# Patient Record
Sex: MALE | Race: WHITE | NOT HISPANIC OR LATINO
[De-identification: names, ages, dates, MRNs, and addresses within clinical notes are randomized per-mention and may not be internally consistent; named-entity substitution may affect disease eponyms.]

---

## 2018-01-19 PROBLEM — Z00.00 ENCOUNTER FOR PREVENTIVE HEALTH EXAMINATION: Status: ACTIVE | Noted: 2018-01-19

## 2019-08-12 ENCOUNTER — APPOINTMENT (OUTPATIENT)
Dept: FAMILY MEDICINE | Facility: CLINIC | Age: 69
End: 2019-08-12
Payer: COMMERCIAL

## 2019-08-12 VITALS
OXYGEN SATURATION: 98 % | HEART RATE: 117 BPM | SYSTOLIC BLOOD PRESSURE: 122 MMHG | RESPIRATION RATE: 14 BRPM | HEIGHT: 76 IN | BODY MASS INDEX: 24.72 KG/M2 | DIASTOLIC BLOOD PRESSURE: 76 MMHG | WEIGHT: 203 LBS

## 2019-08-12 DIAGNOSIS — Z87.891 PERSONAL HISTORY OF NICOTINE DEPENDENCE: ICD-10-CM

## 2019-08-12 DIAGNOSIS — M51.26 OTHER INTERVERTEBRAL DISC DISPLACEMENT, LUMBAR REGION: ICD-10-CM

## 2019-08-12 PROCEDURE — 99213 OFFICE O/P EST LOW 20 MIN: CPT

## 2019-08-12 RX ORDER — CYCLOBENZAPRINE HYDROCHLORIDE 5 MG/1
5 TABLET, FILM COATED ORAL
Qty: 60 | Refills: 0 | Status: ACTIVE | COMMUNITY
Start: 2018-11-27

## 2019-08-12 NOTE — HISTORY OF PRESENT ILLNESS
[___ Weeks ago] : [unfilled] weeks ago [FreeTextEntry8] : He had a fall in his garden.  Eventually he was found to have a fracture in his pelvis . He has an appt with pain management - Dr Medina  on wednesday.   He feels a burning in his throat and has nausea- limits his ability to take his pain meds. He feels the pain is causing  him to have nausea and he can't keep down the pain meds he has.

## 2019-08-12 NOTE — HEALTH RISK ASSESSMENT
[11-15] : 11-15 [Yes] : Yes [2 - 4 times a month (2 pts)] : 2-4 times a month (2 points) [1 or 2 (0 pts)] : 1 or 2 (0 points) [Never (0 pts)] : Never (0 points) [No] : In the past 12 months have you used drugs other than those required for medical reasons? No [Any fall with injury in past year] : Patient reported fall with injury in the past year [0] : 2) Feeling down, depressed, or hopeless: Not at all (0) [] : No [Audit-CScore] : 2 [de-identified] : limited [de-identified] : healthy diet [[id number]] : 0

## 2019-08-12 NOTE — PHYSICAL EXAM
[Ill-Appearing] : ill-appearing [No Edema] : there was no peripheral edema [No Carotid Bruits] : no carotid bruits [Soft] : abdomen soft [Non Tender] : non-tender [Normal Bowel Sounds] : normal bowel sounds [Normal Posterior Cervical Nodes] : no posterior cervical lymphadenopathy [Normal Anterior Cervical Nodes] : no anterior cervical lymphadenopathy [Normal] : affect was normal and insight and judgment were intact

## 2019-08-12 NOTE — ASSESSMENT
[FreeTextEntry1] : BRAT diet reviewed. Rx's for pantoprazole 40 mg  a day and zofran ODT 4 mg 1-2  orally q6h prn for nausea.  He has an appt with pain management on Weds for an epidural injection.

## 2019-08-12 NOTE — REVIEW OF SYSTEMS
[Nausea] : nausea [Vomiting] : vomiting [Heartburn] : heartburn [Joint Pain] : joint pain [FreeTextEntry9] : pelvis [Negative] : Heme/Lymph

## 2019-10-02 ENCOUNTER — RX RENEWAL (OUTPATIENT)
Age: 69
End: 2019-10-02

## 2019-11-01 ENCOUNTER — APPOINTMENT (OUTPATIENT)
Dept: FAMILY MEDICINE | Facility: CLINIC | Age: 69
End: 2019-11-01
Payer: COMMERCIAL

## 2019-11-01 VITALS
HEART RATE: 82 BPM | HEIGHT: 76 IN | SYSTOLIC BLOOD PRESSURE: 108 MMHG | OXYGEN SATURATION: 95 % | BODY MASS INDEX: 24.72 KG/M2 | DIASTOLIC BLOOD PRESSURE: 72 MMHG | RESPIRATION RATE: 14 BRPM | WEIGHT: 203 LBS

## 2019-11-01 PROCEDURE — 99214 OFFICE O/P EST MOD 30 MIN: CPT
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
## 2019-11-01 NOTE — PHYSICAL EXAM
[No Acute Distress] : no acute distress [No Carotid Bruits] : no carotid bruits [No Edema] : there was no peripheral edema [Soft] : abdomen soft [Non Tender] : non-tender [Non-distended] : non-distended [Normal Bowel Sounds] : normal bowel sounds [Normal Posterior Cervical Nodes] : no posterior cervical lymphadenopathy [Normal Anterior Cervical Nodes] : no anterior cervical lymphadenopathy [Normal] : affect was normal and insight and judgment were intact [de-identified] : tender along border of sternum - right > left [de-identified] : unsteady gait with a cane

## 2019-11-01 NOTE — REVIEW OF SYSTEMS
[Vomiting] : no vomiting [Heartburn] : no heartburn [Joint Pain] : no joint pain [Negative] : Heme/Lymph [FreeTextEntry9] : tender along borders of sternum

## 2019-11-08 ENCOUNTER — RX RENEWAL (OUTPATIENT)
Age: 69
End: 2019-11-08

## 2019-12-27 ENCOUNTER — APPOINTMENT (OUTPATIENT)
Dept: FAMILY MEDICINE | Facility: CLINIC | Age: 69
End: 2019-12-27
Payer: MEDICARE

## 2019-12-27 VITALS
HEART RATE: 78 BPM | OXYGEN SATURATION: 97 % | RESPIRATION RATE: 14 BRPM | SYSTOLIC BLOOD PRESSURE: 128 MMHG | TEMPERATURE: 98.7 F | DIASTOLIC BLOOD PRESSURE: 80 MMHG | HEIGHT: 76 IN

## 2019-12-27 PROCEDURE — 99213 OFFICE O/P EST LOW 20 MIN: CPT

## 2019-12-27 NOTE — REVIEW OF SYSTEMS
[Diarrhea] : diarrhea [Joint Pain] : joint pain [Joint Stiffness] : joint stiffness [Back Pain] : back pain [Negative] : Heme/Lymph [Vomiting] : no vomiting [Heartburn] : no heartburn [FreeTextEntry9] : tender along borders of sternum

## 2019-12-27 NOTE — HISTORY OF PRESENT ILLNESS
[FreeTextEntry8] : pt c/o diarrhea,+ nauseous going on and off X 1 month  He was seen at [facility] yesterday.  He was given a BRAT diet.

## 2019-12-27 NOTE — PHYSICAL EXAM
[Normal Rate] : normal rate  [Normal S1, S2] : normal S1 and S2 [No Murmur] : no murmur heard [No Carotid Bruits] : no carotid bruits [No Edema] : there was no peripheral edema [Soft] : abdomen soft [Non Tender] : non-tender [Normal] : affect was normal and insight and judgment were intact [Normal Bowel Sounds] : normal bowel sounds [de-identified] : IR/ IR

## 2019-12-27 NOTE — ASSESSMENT
[FreeTextEntry1] : We reviewed BRAT diet at length,  use acidophillus  BID  and  rx for lomotil  # 25  1 tid prn  No RF and do not use imodium.  If diarrhea stops you stop the lomotil.   Call me on MOnday if not improved.  Use/precautions/safety of medication all reviewed.  [facility]  checked   [phone]  RRC done   Safe use stressed. \par

## 2020-01-10 ENCOUNTER — RX RENEWAL (OUTPATIENT)
Age: 70
End: 2020-01-10

## 2020-01-14 ENCOUNTER — APPOINTMENT (OUTPATIENT)
Dept: FAMILY MEDICINE | Facility: CLINIC | Age: 70
End: 2020-01-14
Payer: MEDICARE

## 2020-01-14 VITALS
WEIGHT: 192 LBS | OXYGEN SATURATION: 90 % | BODY MASS INDEX: 23.38 KG/M2 | DIASTOLIC BLOOD PRESSURE: 70 MMHG | SYSTOLIC BLOOD PRESSURE: 100 MMHG | HEART RATE: 57 BPM | HEIGHT: 76 IN

## 2020-01-14 DIAGNOSIS — Z92.3 PERSONAL HISTORY OF IRRADIATION: ICD-10-CM

## 2020-01-14 PROCEDURE — 99213 OFFICE O/P EST LOW 20 MIN: CPT

## 2020-01-14 NOTE — REVIEW OF SYSTEMS
[Constipation] : constipation [Diarrhea] : diarrhea [Vomiting] : no vomiting [Heartburn] : no heartburn [Joint Pain] : joint pain [Joint Stiffness] : joint stiffness [Back Pain] : back pain [Negative] : Heme/Lymph [FreeTextEntry9] : tender along borders of sternum

## 2020-01-14 NOTE — HISTORY OF PRESENT ILLNESS
[FreeTextEntry8] : pt present for constipation   He saw  Dr Crisostomo- GI   He is using lomotil  bid and has stabilized.

## 2020-01-14 NOTE — ASSESSMENT
[FreeTextEntry1] : 1. and 2.  We reviewed his diet/ activity / use of  lomotil and  probiotics.  He has stabilized and  will slowly  expand his diet.

## 2020-01-14 NOTE — PHYSICAL EXAM
[Normal Rate] : normal rate  [No Murmur] : no murmur heard [Normal S1, S2] : normal S1 and S2 [No Carotid Bruits] : no carotid bruits [Soft] : abdomen soft [No Edema] : there was no peripheral edema [Speech Grossly Normal] : speech grossly normal [Non Tender] : non-tender [Normal Bowel Sounds] : normal bowel sounds [Memory Grossly Normal] : memory grossly normal [Normal Mood] : the mood was normal [Normal Affect] : the affect was normal [Alert and Oriented x3] : oriented to person, place, and time [Normal Insight/Judgement] : insight and judgment were intact [Normal] : affect was normal and insight and judgment were intact [de-identified] : IR/ IR

## 2021-01-21 ENCOUNTER — APPOINTMENT (OUTPATIENT)
Dept: SURGERY | Facility: CLINIC | Age: 71
End: 2021-01-21
Payer: MEDICARE

## 2021-01-21 VITALS
BODY MASS INDEX: 23.38 KG/M2 | SYSTOLIC BLOOD PRESSURE: 122 MMHG | DIASTOLIC BLOOD PRESSURE: 85 MMHG | WEIGHT: 192 LBS | HEART RATE: 73 BPM | HEIGHT: 76 IN | OXYGEN SATURATION: 92 % | TEMPERATURE: 97.2 F

## 2021-01-21 PROCEDURE — 99204 OFFICE O/P NEW MOD 45 MIN: CPT

## 2021-01-21 NOTE — PHYSICAL EXAM
[JVD] : no jugular venous distention  [Normal Breath Sounds] : Normal breath sounds [Normal Heart Sounds] : normal heart sounds [Alert] : alert [Oriented to Person] : oriented to person [Oriented to Place] : oriented to place [Oriented to Time] : oriented to time [Calm] : calm [de-identified] : Well-developed well-nourished man in no apparent distress [de-identified] : Anicteric, mucous membranes moist [de-identified] : Understood consultation [de-identified] : Normoactive bowel sounds, no hepatosplenomegaly, no masses, non-tender.

## 2021-01-21 NOTE — HISTORY OF PRESENT ILLNESS
[de-identified] : Mr. JAVIER CARBAJAL is a 70 year-old man with stage 4 prostate cancer, heart disease and history of inability to eat solids with occasional vomiting, referred by Dr. Ortega for evaluation of hiatal hernia. Denies fever, chills, nausea or changes in bowel or bladder habits. \par CT abd/pelv done 7/2020 shows large hiatal hernia containing the stomach and colon. \par Patient reports significant weight loss secondary to same.\par Has a history of well-controlled stage IV prostate CA, CT done to evaluate same.\par \par

## 2021-01-21 NOTE — PLAN
[FreeTextEntry1] : Review CT images when available\par Discuss with GI whether further work-up is necessary\par \par Robot-assisted paraesophageal hernia repair with partial fundoplication\par \par We discussed risks and benefits of the procedure, including recurrence rate of 10-20%, gas bloat, dysphagia, bleeding, infection, and damage to surrounding organs and structures. We discussed the possibility of mesh placement if the repair is under any tension. We discussed the postoperative liquid diet. The patient expressed excellent understanding of the procedure, its risks, and its limitations. All questions were answered and the patient agrees to proceed with surgery.\par \par -We will need medical clearance that addresses his history of prostate CA and additional work-up per PMD.

## 2021-01-21 NOTE — CONSULT LETTER
[Dear  ___] : Dear  [unfilled], [Consult Letter:] : I had the pleasure of evaluating your patient, [unfilled]. [Please see my note below.] : Please see my note below. [Consult Closing:] : Thank you very much for allowing me to participate in the care of this patient.  If you have any questions, please do not hesitate to contact me. [Sincerely,] : Sincerely, [FreeTextEntry3] : Darion Carty MD, FACS, FASMBS\par , Department of Surgery [facility]\par Director of Metabolic and Bariatric Surgery [facility]\par Director of Metabolic and Bariatric Surgery, and Robotic Minimally Invasive Surgery at [facility] [DrBill  ___] : Dr. MANZANARES

## 2021-02-08 ENCOUNTER — OUTPATIENT (OUTPATIENT)
Dept: OUTPATIENT SERVICES | Facility: HOSPITAL | Age: 71
LOS: 1 days | End: 2021-02-08
Payer: MEDICARE

## 2021-02-08 VITALS
RESPIRATION RATE: 20 BRPM | WEIGHT: 178.79 LBS | HEIGHT: 76 IN | SYSTOLIC BLOOD PRESSURE: 114 MMHG | HEART RATE: 63 BPM | DIASTOLIC BLOOD PRESSURE: 88 MMHG | TEMPERATURE: 98 F

## 2021-02-08 DIAGNOSIS — Z90.89 ACQUIRED ABSENCE OF OTHER ORGANS: Chronic | ICD-10-CM

## 2021-02-08 DIAGNOSIS — Z29.9 ENCOUNTER FOR PROPHYLACTIC MEASURES, UNSPECIFIED: ICD-10-CM

## 2021-02-08 DIAGNOSIS — Z13.89 ENCOUNTER FOR SCREENING FOR OTHER DISORDER: ICD-10-CM

## 2021-02-08 DIAGNOSIS — Z01.818 ENCOUNTER FOR OTHER PREPROCEDURAL EXAMINATION: ICD-10-CM

## 2021-02-08 DIAGNOSIS — Z98.890 OTHER SPECIFIED POSTPROCEDURAL STATES: Chronic | ICD-10-CM

## 2021-02-08 DIAGNOSIS — I48.91 UNSPECIFIED ATRIAL FIBRILLATION: ICD-10-CM

## 2021-02-08 DIAGNOSIS — K44.9 DIAPHRAGMATIC HERNIA WITHOUT OBSTRUCTION OR GANGRENE: ICD-10-CM

## 2021-02-08 LAB
A1C WITH ESTIMATED AVERAGE GLUCOSE RESULT: 5.7 % — HIGH (ref 4–5.6)
ALBUMIN SERPL ELPH-MCNC: 3.9 G/DL — SIGNIFICANT CHANGE UP (ref 3.3–5.2)
ALP SERPL-CCNC: 92 U/L — SIGNIFICANT CHANGE UP (ref 40–120)
ALT FLD-CCNC: 7 U/L — SIGNIFICANT CHANGE UP
ANION GAP SERPL CALC-SCNC: 7 MMOL/L — SIGNIFICANT CHANGE UP (ref 5–17)
APTT BLD: 40 SEC — HIGH (ref 27.5–35.5)
AST SERPL-CCNC: 19 U/L — SIGNIFICANT CHANGE UP
BASOPHILS # BLD AUTO: 0.06 K/UL — SIGNIFICANT CHANGE UP (ref 0–0.2)
BASOPHILS NFR BLD AUTO: 0.9 % — SIGNIFICANT CHANGE UP (ref 0–2)
BILIRUB SERPL-MCNC: 0.3 MG/DL — LOW (ref 0.4–2)
BLD GP AB SCN SERPL QL: SIGNIFICANT CHANGE UP
BUN SERPL-MCNC: 11 MG/DL — SIGNIFICANT CHANGE UP (ref 8–20)
CALCIUM SERPL-MCNC: 8.6 MG/DL — SIGNIFICANT CHANGE UP (ref 8.6–10.2)
CHLORIDE SERPL-SCNC: 108 MMOL/L — HIGH (ref 98–107)
CO2 SERPL-SCNC: 27 MMOL/L — SIGNIFICANT CHANGE UP (ref 22–29)
CREAT SERPL-MCNC: 0.75 MG/DL — SIGNIFICANT CHANGE UP (ref 0.5–1.3)
EOSINOPHIL # BLD AUTO: 0.17 K/UL — SIGNIFICANT CHANGE UP (ref 0–0.5)
EOSINOPHIL NFR BLD AUTO: 2.4 % — SIGNIFICANT CHANGE UP (ref 0–6)
ESTIMATED AVERAGE GLUCOSE: 117 MG/DL — HIGH (ref 68–114)
GLUCOSE SERPL-MCNC: 108 MG/DL — HIGH (ref 70–99)
HCT VFR BLD CALC: 46.9 % — SIGNIFICANT CHANGE UP (ref 39–50)
HGB BLD-MCNC: 14.9 G/DL — SIGNIFICANT CHANGE UP (ref 13–17)
IMM GRANULOCYTES NFR BLD AUTO: 0.3 % — SIGNIFICANT CHANGE UP (ref 0–1.5)
INR BLD: 1.39 RATIO — HIGH (ref 0.88–1.16)
LYMPHOCYTES # BLD AUTO: 0.81 K/UL — LOW (ref 1–3.3)
LYMPHOCYTES # BLD AUTO: 11.7 % — LOW (ref 13–44)
MCHC RBC-ENTMCNC: 31.8 GM/DL — LOW (ref 32–36)
MCHC RBC-ENTMCNC: 32.4 PG — SIGNIFICANT CHANGE UP (ref 27–34)
MCV RBC AUTO: 102 FL — HIGH (ref 80–100)
MONOCYTES # BLD AUTO: 0.52 K/UL — SIGNIFICANT CHANGE UP (ref 0–0.9)
MONOCYTES NFR BLD AUTO: 7.5 % — SIGNIFICANT CHANGE UP (ref 2–14)
MRSA PCR RESULT.: SIGNIFICANT CHANGE UP
NEUTROPHILS # BLD AUTO: 5.37 K/UL — SIGNIFICANT CHANGE UP (ref 1.8–7.4)
NEUTROPHILS NFR BLD AUTO: 77.2 % — HIGH (ref 43–77)
PLATELET # BLD AUTO: 223 K/UL — SIGNIFICANT CHANGE UP (ref 150–400)
POTASSIUM SERPL-MCNC: 3.9 MMOL/L — SIGNIFICANT CHANGE UP (ref 3.5–5.3)
POTASSIUM SERPL-SCNC: 3.9 MMOL/L — SIGNIFICANT CHANGE UP (ref 3.5–5.3)
PROT SERPL-MCNC: 6.5 G/DL — LOW (ref 6.6–8.7)
PROTHROM AB SERPL-ACNC: 15.9 SEC — HIGH (ref 10.6–13.6)
RBC # BLD: 4.6 M/UL — SIGNIFICANT CHANGE UP (ref 4.2–5.8)
RBC # FLD: 13.1 % — SIGNIFICANT CHANGE UP (ref 10.3–14.5)
S AUREUS DNA NOSE QL NAA+PROBE: SIGNIFICANT CHANGE UP
SODIUM SERPL-SCNC: 142 MMOL/L — SIGNIFICANT CHANGE UP (ref 135–145)
WBC # BLD: 6.95 K/UL — SIGNIFICANT CHANGE UP (ref 3.8–10.5)
WBC # FLD AUTO: 6.95 K/UL — SIGNIFICANT CHANGE UP (ref 3.8–10.5)

## 2021-02-08 PROCEDURE — 93010 ELECTROCARDIOGRAM REPORT: CPT

## 2021-02-08 PROCEDURE — G0463: CPT

## 2021-02-08 PROCEDURE — 93005 ELECTROCARDIOGRAM TRACING: CPT

## 2021-02-08 NOTE — PATIENT PROFILE ADULT - NSPROPTRIGHTBILLOFRIGHTS_GEN_A_NUR
TRANSFER - IN REPORT:    Verbal [facility]erv[facility] He [facility] (name[facility]  [facility] from [address] [place] [address] ([facility]) for routine progression of care      Report consisted of patients Situation, Background, Assessment and   Recommendations(SBAR). Information from the following report(s) SBAR, Kardex, ED Summary, OR Summary, Procedure Summary, Intake/Output, MAR, Recent Results and Med Rec Status was reviewed with the receiving nurse. Opportunity for questions and clarification was provided. Assessment completed upon patients arrival to unit and care assumed. patient

## 2021-02-08 NOTE — H&P PST ADULT - HISTORY OF PRESENT ILLNESS
69 y/o male presents to PST today. Patient reports a history of  69 y/o male presents to [facility] today. Patient reports a history of hital hernia for about 30 to 40 years. Patient reports that he has prostate cancer and can no longer eat regular meals causing him to lose weight and causing discomfort in the abdomen. Denies taking any pain medications for this discomfort. c/o diarrhea related to prostate cancer. C/o SOB with exertion at times. Denies any N/V, constiupation, chest pain. Scheduled for  69 y/o male presents to [facility] today. Patient reports a history of hital hernia for about 30 to 40 years. Patient reports that he has prostate cancer and can no longer eat regular meals causing him to lose weight and causing discomfort in the abdomen. Denies taking any pain medications for this discomfort. c/o diarrhea related to prostate cancer. C/o SOB with exertion at times. Denies any N/V, constiupation, chest pain. Scheduled for robotic assisted paraesophageal repair with fundoplication 2/17/2021 71 y/o male presents to [facility] today. Patient reports a history of hital hernia for about 30 to 40 years. Patient reports that he has prostate cancer and can no longer eat regular meals causing him to lose weight and causing discomfort in the abdomen. Denies taking any pain medications for this discomfort. c/o diarrhea related to prostate cancer. C/o SOB with exertion at times. Denies any N/V, constipation, chest pain. Scheduled for robotic assisted paraesophageal repair with fundoplication 2/17/2021

## 2021-02-08 NOTE — PATIENT PROFILE ADULT - NSPROHMSYMPCOND_GEN_A_NUR
Pre op teaching surgical scrub pain management instructions given to pt    Covid swab to be done Feb 14/none

## 2021-02-08 NOTE — H&P PST ADULT - NSICDXPASTMEDICALHX_GEN_ALL_CORE_FT
PAST MEDICAL HISTORY:  Afib     At risk for sleep apnea Bang score 4    Chronic diarrhea     History of gastroesophageal reflux (GERD)     Hypothyroidism Resolved at 12 years old    Osteoarthritis     Prostate CA Stage 4    Rheumatoid arthritis      PAST MEDICAL HISTORY:  Afib     Allergic bronchitis     At risk for sleep apnea Bang score 4    Chronic diarrhea     History of gastroesophageal reflux (GERD)     Hypothyroidism Resolved at 12 years old    Osteoarthritis     Prostate CA Stage 4    Rheumatoid arthritis

## 2021-02-08 NOTE — H&P PST ADULT - NSICDXPROBLEM_GEN_ALL_CORE_FT
PROBLEM DIAGNOSES  Problem: Diaphragmatic hernia without obstruction or gangrene  Assessment and Plan: Scheduled for robotic assisted paraesophageal hernia repair with fundoplication 2/17/2021  Medical clearance  Cardiac clearance    Problem: Need for prophylactic measure  Assessment and Plan: Caprini 4- Moderate risk  Primary team please assess need for DVT prophylaxis    Problem: Screening for substance abuse  Assessment and Plan: ORT 0    Problem: Afib  Assessment and Plan: Follows with cardiologist

## 2021-02-08 NOTE — H&P PST ADULT - NSICDXFAMILYHX_GEN_ALL_CORE_FT
FAMILY HISTORY:  Father  Still living? No  FH: heart disease, Age at diagnosis: Age Unknown    Sibling  Still living? Yes, Estimated age: 61-70  Family history of glioblastoma, Age at diagnosis: Age Unknown  FH: diabetes mellitus, Age at diagnosis: Age Unknown

## 2021-02-08 NOTE — H&P PST ADULT - ASSESSMENT
69 y/o male scheduled for robotic assisted paraesophageal hernia repair with fundoplication 2021      OPIOID RISK TOOL    GISELA EACH BOX THAT APPLIES AND ADD TOTALS AT THE END    FAMILY HISTORY OF SUBSTANCE ABUSE                 FEMALE         MALE                                                Alcohol                             [  ]1 pt          [  ]3pts                                               Illegal Durgs                     [  ]2 pts        [  ]3pts                                               Rx Drugs                           [  ]4 pts        [  ]4 pts    PERSONAL HISTORY OF SUBSTANCE ABUSE                                                                                          Alcohol                             [  ]3 pts       [  ]3 pts                                               Illegal Drugs                     [  ]4 pts        [  ]4 pts                                               Rx Drugs                           [  ]5 pts        [  ]5 pts    AGE BETWEEN 16-45 YEARS                                      [  ]1 pt         [  ]1 pt    HISTORY OF PREADOLESCENT   SEXUAL ABUSE                                                             [  ]3 pts        [  ]0pts    PSYCHOLOGICAL DISEASE                     ADD, OCD, Bipolar, Schizophrenia        [  ]2 pts         [  ]2 pts                      Depression                                               [  ]1 pt           [  ]1 pt           SCORING TOTAL   (add numbers and type here)              (0)                                     A score of 3 or lower indicated LOW risk for future opioid abuse  A score of 4 to 7 indicated moderate risk for future opioid abuse  A score of 8 or higher indicates a high risk for opioid abuse    CAPRINI SCORE [CLOT]    AGE RELATED RISK FACTORS                                                       MOBILITY RELATED FACTORS  [ ] Age 41-60 years                                            (1 Point)                  [ ] Bed rest                                                        (1 Point)  [x ] Age: 61-74 years                                           (2 Points)                 [ ] Plaster cast                                                   (2 Points)  [ ] Age= 75 years                                              (3 Points)                 [ ] Bed bound for more than 72 hours                 (2 Points)    DISEASE RELATED RISK FACTORS                                               GENDER SPECIFIC FACTORS  [ ] Edema in the lower extremities                       (1 Point)                  [ ] Pregnancy                                                     (1 Point)  [ ] Varicose veins                                               (1 Point)                  [ ] Post-partum < 6 weeks                                   (1 Point)             [ ] BMI > 25 Kg/m2                                            (1 Point)                  [ ] Hormonal therapy  or oral contraception          (1 Point)                 [ ] Sepsis (in the previous month)                        (1 Point)                  [ ] History of pregnancy complications                 (1 point)  [ ] Pneumonia or serious lung disease                                               [ ] Unexplained or recurrent                     (1 Point)           (in the previous month)                               (1 Point)  [ ] Abnormal pulmonary function test                     (1 Point)                 SURGERY RELATED RISK FACTORS  [ ] Acute myocardial infarction                              (1 Point)                 [ ]  Section                                             (1 Point)  [ ] Congestive heart failure (in the previous month)  (1 Point)               [ ] Minor surgery                                                  (1 Point)   [ ] Inflammatory bowel disease                             (1 Point)                 [ ] Arthroscopic surgery                                        (2 Points)  [ ] Central venous access                                      (2 Points)                [x ] General surgery lasting more than 45 minutes   (2 Points)       [ ] Stroke (in the previous month)                          (5 Points)               [ ] Elective arthroplasty                                         (5 Points)                                                                                                                                               HEMATOLOGY RELATED FACTORS                                                 TRAUMA RELATED RISK FACTORS  [ ] Prior episodes of VTE                                     (3 Points)                [ ] Fracture of the hip, pelvis, or leg                       (5 Points)  [ ] Positive family history for VTE                         (3 Points)                 [ ] Acute spinal cord injury (in the previous month)  (5 Points)  [ ] Prothrombin 30183 A                                     (3 Points)                 [ ] Paralysis  (less than 1 month)                             (5 Points)  [ ] Factor V Leiden                                             (3 Points)                  [ ] Multiple Trauma within 1 month                        (5 Points)  [ ] Lupus anticoagulants                                     (3 Points)                                                           [ ] Anticardiolipin antibodies                               (3 Points)                                                       [ ] High homocysteine in the blood                      (3 Points)                                             [ ] Other congenital or acquired thrombophilia      (3 Points)                                                [ ] Heparin induced thrombocytopenia                  (3 Points)                                          Total Score [          ]    Caprini Score 0 - 2:  Low Risk, No VTE Prophylaxis required for most patients, encourage ambulation  Caprini Score 3 - 6:  At Risk, pharmacologic VTE prophylaxis is indicated for most patients (in the absence of a contraindication)  Caprini Score Greater than or = 7:  High Risk, pharmacologic VTE prophylaxis is indicated for most patients (in the absence of a contraindication)

## 2021-02-08 NOTE — H&P PST ADULT - NSICDXPASTSURGICALHX_GEN_ALL_CORE_FT
PAST SURGICAL HISTORY:  H/O knee surgery x3    History of tonsillectomy as a child     PAST SURGICAL HISTORY:  H/O knee surgery x3    History of facial surgery     History of tonsillectomy as a child

## 2021-02-09 ENCOUNTER — APPOINTMENT (OUTPATIENT)
Dept: FAMILY MEDICINE | Facility: CLINIC | Age: 71
End: 2021-02-09
Payer: MEDICARE

## 2021-02-09 VITALS
HEIGHT: 76 IN | HEART RATE: 92 BPM | OXYGEN SATURATION: 96 % | WEIGHT: 179 LBS | RESPIRATION RATE: 14 BRPM | TEMPERATURE: 98.1 F | SYSTOLIC BLOOD PRESSURE: 106 MMHG | DIASTOLIC BLOOD PRESSURE: 62 MMHG | BODY MASS INDEX: 21.8 KG/M2

## 2021-02-09 PROBLEM — Z87.19 PERSONAL HISTORY OF OTHER DISEASES OF THE DIGESTIVE SYSTEM: Chronic | Status: ACTIVE | Noted: 2021-02-08

## 2021-02-09 PROBLEM — I48.91 UNSPECIFIED ATRIAL FIBRILLATION: Chronic | Status: ACTIVE | Noted: 2021-02-08

## 2021-02-09 PROBLEM — C61 MALIGNANT NEOPLASM OF PROSTATE: Chronic | Status: ACTIVE | Noted: 2021-02-08

## 2021-02-09 PROBLEM — J45.909 UNSPECIFIED ASTHMA, UNCOMPLICATED: Chronic | Status: ACTIVE | Noted: 2021-02-08

## 2021-02-09 PROBLEM — M19.90 UNSPECIFIED OSTEOARTHRITIS, UNSPECIFIED SITE: Chronic | Status: ACTIVE | Noted: 2021-02-08

## 2021-02-09 PROBLEM — M06.9 RHEUMATOID ARTHRITIS, UNSPECIFIED: Chronic | Status: ACTIVE | Noted: 2021-02-08

## 2021-02-09 PROBLEM — Z91.89 OTHER SPECIFIED PERSONAL RISK FACTORS, NOT ELSEWHERE CLASSIFIED: Chronic | Status: ACTIVE | Noted: 2021-02-08

## 2021-02-09 PROBLEM — E03.9 HYPOTHYROIDISM, UNSPECIFIED: Chronic | Status: ACTIVE | Noted: 2021-02-08

## 2021-02-09 PROBLEM — K52.9 NONINFECTIVE GASTROENTERITIS AND COLITIS, UNSPECIFIED: Chronic | Status: ACTIVE | Noted: 2021-02-08

## 2021-02-09 PROCEDURE — 99214 OFFICE O/P EST MOD 30 MIN: CPT

## 2021-02-09 RX ORDER — ONDANSETRON 4 MG/1
4 TABLET, ORALLY DISINTEGRATING ORAL
Qty: 25 | Refills: 2 | Status: DISCONTINUED | COMMUNITY
Start: 2019-08-12 | End: 2021-02-09

## 2021-02-09 RX ORDER — BICALUTAMIDE 50 MG/1
50 TABLET ORAL
Qty: 30 | Refills: 0 | Status: DISCONTINUED | COMMUNITY
Start: 2018-12-20 | End: 2021-02-09

## 2021-02-09 RX ORDER — PREDNISONE 5 MG/1
5 TABLET ORAL
Qty: 60 | Refills: 0 | Status: DISCONTINUED | COMMUNITY
Start: 2019-04-05 | End: 2021-02-09

## 2021-02-09 RX ORDER — TADALAFIL 2.5 MG/1
2.5 TABLET, FILM COATED ORAL
Qty: 5 | Refills: 0 | Status: DISCONTINUED | COMMUNITY
Start: 2019-10-25 | End: 2021-02-09

## 2021-02-09 RX ORDER — METOPROLOL TARTRATE 50 MG/1
50 TABLET, FILM COATED ORAL
Refills: 0 | Status: ACTIVE | COMMUNITY
Start: 2018-04-05

## 2021-02-09 RX ORDER — GABAPENTIN 300 MG/1
300 CAPSULE ORAL TWICE DAILY
Refills: 0 | Status: ACTIVE | COMMUNITY
Start: 2019-01-31

## 2021-02-09 RX ORDER — PANTOPRAZOLE 40 MG/1
40 TABLET, DELAYED RELEASE ORAL
Qty: 90 | Refills: 1 | Status: DISCONTINUED | COMMUNITY
Start: 2019-08-12 | End: 2021-02-09

## 2021-02-09 NOTE — HISTORY OF PRESENT ILLNESS
[Atrial Fibrillation] : atrial fibrillation [Asthma] : asthma [Family Member] : family member with adverse anesthesia reaction/sudden death [Chronic Anticoagulation] : chronic anticoagulation [Anticoagulants: _____] : Anticoagulants: [unfilled] [Moderate (4-6 METs)] : Moderate (4-6 METs) [Aortic Stenosis] : no aortic stenosis [Coronary Artery Disease] : no coronary artery disease [Recent Myocardial Infarction] : no recent myocardial infarction [Implantable Device/Pacemaker] : no implantable device/pacemaker [COPD] : no COPD [Sleep Apnea] : no sleep apnea [Smoker] : not a smoker [Self] : no previous adverse anesthesia reaction [Chronic Kidney Disease] : no chronic kidney disease [Diabetes] : no diabetes [FreeTextEntry1] : Paraesophageal Hernia Repair with partial fundoplication [FreeTextEntry3] : Dr. Carty @ [facility] [FreeTextEntry2] : Wed 02/17/2021 [FreeTextEntry4] : States he is here today for medical clearance.  [FreeTextEntry5] : Mother had an issue with anesthesia.  [FreeTextEntry6] : Admits to sporadic SOB that is from AFib. No acute issues with dyspnea.  [FreeTextEntry7] : Echo 3/2017 normal with preserved EF 65% \par Lexiscan 4/2017 nonischemic SPECT

## 2021-02-09 NOTE — PHYSICAL EXAM
[No Acute Distress] : no acute distress [Well Nourished] : well nourished [Well Developed] : well developed [Well-Appearing] : well-appearing [Normal Sclera/Conjunctiva] : normal sclera/conjunctiva [Normal Outer Ear/Nose] : the outer ears and nose were normal in appearance [Normal Oropharynx] : the oropharynx was normal [Normal TMs] : both tympanic membranes were normal [No JVD] : no jugular venous distention [No Respiratory Distress] : no respiratory distress  [Normal Rate] : normal rate  [Normal S1, S2] : normal S1 and S2 [No Carotid Bruits] : no carotid bruits [No Edema] : there was no peripheral edema [No Extremity Clubbing/Cyanosis] : no extremity clubbing/cyanosis [Soft] : abdomen soft [Non Tender] : non-tender [Non-distended] : non-distended [Normal Affect] : the affect was normal [Normal Insight/Judgement] : insight and judgment were intact [de-identified] : irregular irregular, 2/6 systolic murmur  [de-identified] : palpable LUQ hernia, NTND  [de-identified] : uses cane for walking

## 2021-02-09 NOTE — ASSESSMENT
[No Further Testing Recommended] : no further testing recommended [Patient Optimized for Surgery] : Patient optimized for surgery [Modify anticoagulant treatment prior to procedure] : Modify anticoagulant treatment prior to procedure [Continue medications as is] : Continue current medications [As per surgery] : as per surgery [FreeTextEntry4] : Patient is intermediate risk and medically cleared for planned robotic assisted paraesophageal hernia repair on 2/17/2021 by Dr. Carty @ [facility] [FreeTextEntry5] : stop Eliquis 2-3 days prior to surgery

## 2021-02-09 NOTE — RESULTS/DATA
[] : results reviewed [de-identified] : NSR HR 66 incomplete RBBB  [de-identified] : A1c 5.7\par  MRSA not detected\par O positive \par negative AB screen \par

## 2021-02-09 NOTE — PLAN
[FreeTextEntry1] : 70 yr old male is intermediate risk and is medically cleared for surgery on 2/17/2021 \par Cardio clearance note has been reviewed \par EKG and presurgical lab testing has been reviewed \par pt is advised to stop Eliquis 2-3 days prior to surgery \par pt may take morning medications day of surgery with sips of water \par pt to be NPO after midnight prior to surgery \par \par 1. Afib on Eliquis: \par stable, continue medications as advised prior to surgery \par echo, Lexiscan results from 2017 have been reviewed \par cardiologist consult note reviewed \par \par 2. Chronic QUINTERO: \par stable, pt is saturating >96% on RA \par \par 3.Rheumatoid arthritis: \par stable, continue current medications as directed. \par \par 4.Carcinoma of Prostate: \par stable at this time. \par pt follow Dr. Neil from [place] \par reported MRI results from 2019 have been reviewed ( no mets to spine) \par

## 2021-02-14 ENCOUNTER — APPOINTMENT (OUTPATIENT)
Dept: DISASTER EMERGENCY | Facility: CLINIC | Age: 71
End: 2021-02-14

## 2021-02-15 LAB — SARS-COV-2 N GENE NPH QL NAA+PROBE: NOT DETECTED

## 2021-02-15 RX ORDER — OMEPRAZOLE 10 MG/1
1 CAPSULE, DELAYED RELEASE ORAL
Qty: 30 | Refills: 0
Start: 2021-02-15 | End: 2021-03-16

## 2021-02-15 RX ORDER — HYOSCYAMINE SULFATE 0.13 MG
1 TABLET ORAL
Qty: 56 | Refills: 0
Start: 2021-02-15 | End: 2021-03-16

## 2021-02-15 RX ORDER — ONDANSETRON 8 MG/1
1 TABLET, FILM COATED ORAL
Qty: 56 | Refills: 0
Start: 2021-02-15

## 2021-02-15 RX ORDER — ACETAMINOPHEN 500 MG
30 TABLET ORAL
Qty: 150 | Refills: 0
Start: 2021-02-15 | End: 2021-02-19

## 2021-02-16 ENCOUNTER — TRANSCRIPTION ENCOUNTER (OUTPATIENT)
Age: 71
End: 2021-02-16

## 2021-02-17 ENCOUNTER — RESULT REVIEW (OUTPATIENT)
Age: 71
End: 2021-02-17

## 2021-02-17 ENCOUNTER — APPOINTMENT (OUTPATIENT)
Dept: SURGERY | Facility: HOSPITAL | Age: 71
End: 2021-02-17
Payer: MEDICARE

## 2021-02-17 ENCOUNTER — INPATIENT (INPATIENT)
Facility: HOSPITAL | Age: 71
LOS: 0 days | Discharge: HOME | DRG: 326 | End: 2021-02-18
Attending: SURGERY | Admitting: SURGERY
Payer: MEDICARE

## 2021-02-17 VITALS
DIASTOLIC BLOOD PRESSURE: 77 MMHG | TEMPERATURE: 97 F | HEART RATE: 96 BPM | HEIGHT: 76 IN | WEIGHT: 178.57 LBS | OXYGEN SATURATION: 95 % | SYSTOLIC BLOOD PRESSURE: 96 MMHG | RESPIRATION RATE: 16 BRPM

## 2021-02-17 DIAGNOSIS — Z98.890 OTHER SPECIFIED POSTPROCEDURAL STATES: Chronic | ICD-10-CM

## 2021-02-17 DIAGNOSIS — Z90.89 ACQUIRED ABSENCE OF OTHER ORGANS: Chronic | ICD-10-CM

## 2021-02-17 DIAGNOSIS — K44.9 DIAPHRAGMATIC HERNIA WITHOUT OBSTRUCTION OR GANGRENE: ICD-10-CM

## 2021-02-17 LAB
ABO RH CONFIRMATION: SIGNIFICANT CHANGE UP
ANION GAP SERPL CALC-SCNC: 10 MMOL/L — SIGNIFICANT CHANGE UP (ref 5–17)
APTT BLD: 37.1 SEC — HIGH (ref 27.5–35.5)
BASOPHILS # BLD AUTO: 0 K/UL — SIGNIFICANT CHANGE UP (ref 0–0.2)
BASOPHILS NFR BLD AUTO: 0 % — SIGNIFICANT CHANGE UP (ref 0–2)
BUN SERPL-MCNC: 20 MG/DL — SIGNIFICANT CHANGE UP (ref 8–20)
CALCIUM SERPL-MCNC: 8.6 MG/DL — SIGNIFICANT CHANGE UP (ref 8.6–10.2)
CHLORIDE SERPL-SCNC: 109 MMOL/L — HIGH (ref 98–107)
CO2 SERPL-SCNC: 22 MMOL/L — SIGNIFICANT CHANGE UP (ref 22–29)
CREAT SERPL-MCNC: 1.07 MG/DL — SIGNIFICANT CHANGE UP (ref 0.5–1.3)
EOSINOPHIL # BLD AUTO: 0 K/UL — SIGNIFICANT CHANGE UP (ref 0–0.5)
EOSINOPHIL NFR BLD AUTO: 0 % — SIGNIFICANT CHANGE UP (ref 0–6)
GLUCOSE SERPL-MCNC: 173 MG/DL — HIGH (ref 70–99)
HCT VFR BLD CALC: 43 % — SIGNIFICANT CHANGE UP (ref 39–50)
HGB BLD-MCNC: 13.5 G/DL — SIGNIFICANT CHANGE UP (ref 13–17)
INR BLD: 1.01 RATIO — SIGNIFICANT CHANGE UP (ref 0.88–1.16)
LYMPHOCYTES # BLD AUTO: 0.26 K/UL — LOW (ref 1–3.3)
LYMPHOCYTES # BLD AUTO: 1.8 % — LOW (ref 13–44)
MACROCYTES BLD QL: SLIGHT — SIGNIFICANT CHANGE UP
MANUAL SMEAR VERIFICATION: SIGNIFICANT CHANGE UP
MCHC RBC-ENTMCNC: 31.4 GM/DL — LOW (ref 32–36)
MCHC RBC-ENTMCNC: 32.4 PG — SIGNIFICANT CHANGE UP (ref 27–34)
MCV RBC AUTO: 103.1 FL — HIGH (ref 80–100)
MONOCYTES # BLD AUTO: 0.51 K/UL — SIGNIFICANT CHANGE UP (ref 0–0.9)
MONOCYTES NFR BLD AUTO: 3.5 % — SIGNIFICANT CHANGE UP (ref 2–14)
NEUTROPHILS # BLD AUTO: 13.91 K/UL — HIGH (ref 1.8–7.4)
NEUTROPHILS NFR BLD AUTO: 91.2 % — HIGH (ref 43–77)
NEUTS BAND # BLD: 3.5 % — SIGNIFICANT CHANGE UP (ref 0–8)
PLAT MORPH BLD: NORMAL — SIGNIFICANT CHANGE UP
PLATELET # BLD AUTO: 207 K/UL — SIGNIFICANT CHANGE UP (ref 150–400)
POTASSIUM SERPL-MCNC: 4.2 MMOL/L — SIGNIFICANT CHANGE UP (ref 3.5–5.3)
POTASSIUM SERPL-SCNC: 4.2 MMOL/L — SIGNIFICANT CHANGE UP (ref 3.5–5.3)
PROTHROM AB SERPL-ACNC: 11.7 SEC — SIGNIFICANT CHANGE UP (ref 10.6–13.6)
RBC # BLD: 4.17 M/UL — LOW (ref 4.2–5.8)
RBC # FLD: 13.1 % — SIGNIFICANT CHANGE UP (ref 10.3–14.5)
RBC BLD AUTO: ABNORMAL
SODIUM SERPL-SCNC: 141 MMOL/L — SIGNIFICANT CHANGE UP (ref 135–145)
WBC # BLD: 14.69 K/UL — HIGH (ref 3.8–10.5)
WBC # FLD AUTO: 14.69 K/UL — HIGH (ref 3.8–10.5)

## 2021-02-17 PROCEDURE — S2900 ROBOTIC SURGICAL SYSTEM: CPT | Mod: NC

## 2021-02-17 PROCEDURE — 43281 LAP PARAESOPHAG HERN REPAIR: CPT

## 2021-02-17 PROCEDURE — 43281 LAP PARAESOPHAG HERN REPAIR: CPT | Mod: AS

## 2021-02-17 PROCEDURE — 88302 TISSUE EXAM BY PATHOLOGIST: CPT | Mod: 26

## 2021-02-17 PROCEDURE — 43281 LAP PARAESOPHAG HERN REPAIR: CPT | Mod: 80

## 2021-02-17 RX ORDER — FOLIC ACID 0.8 MG
1 TABLET ORAL
Qty: 0 | Refills: 0 | DISCHARGE

## 2021-02-17 RX ORDER — FENTANYL CITRATE 50 UG/ML
25 INJECTION INTRAVENOUS
Refills: 0 | Status: DISCONTINUED | OUTPATIENT
Start: 2021-02-17 | End: 2021-02-17

## 2021-02-17 RX ORDER — METOCLOPRAMIDE HCL 10 MG
10 TABLET ORAL EVERY 6 HOURS
Refills: 0 | Status: DISCONTINUED | OUTPATIENT
Start: 2021-02-17 | End: 2021-02-18

## 2021-02-17 RX ORDER — TAMSULOSIN HYDROCHLORIDE 0.4 MG/1
0 CAPSULE ORAL
Qty: 0 | Refills: 0 | DISCHARGE

## 2021-02-17 RX ORDER — GABAPENTIN 400 MG/1
300 CAPSULE ORAL ONCE
Refills: 0 | Status: COMPLETED | OUTPATIENT
Start: 2021-02-17 | End: 2021-02-17

## 2021-02-17 RX ORDER — OXYCODONE HYDROCHLORIDE 5 MG/1
1 TABLET ORAL
Qty: 0 | Refills: 0 | DISCHARGE

## 2021-02-17 RX ORDER — CYCLOBENZAPRINE HYDROCHLORIDE 10 MG/1
0 TABLET, FILM COATED ORAL
Qty: 0 | Refills: 0 | DISCHARGE

## 2021-02-17 RX ORDER — SODIUM CHLORIDE 9 MG/ML
1000 INJECTION, SOLUTION INTRAVENOUS
Refills: 0 | Status: DISCONTINUED | OUTPATIENT
Start: 2021-02-17 | End: 2021-02-18

## 2021-02-17 RX ORDER — ONDANSETRON 8 MG/1
4 TABLET, FILM COATED ORAL EVERY 6 HOURS
Refills: 0 | Status: DISCONTINUED | OUTPATIENT
Start: 2021-02-17 | End: 2021-02-18

## 2021-02-17 RX ORDER — ACETAMINOPHEN 500 MG
975 TABLET ORAL ONCE
Refills: 0 | Status: COMPLETED | OUTPATIENT
Start: 2021-02-17 | End: 2021-02-17

## 2021-02-17 RX ORDER — ONDANSETRON 8 MG/1
4 TABLET, FILM COATED ORAL ONCE
Refills: 0 | Status: DISCONTINUED | OUTPATIENT
Start: 2021-02-17 | End: 2021-02-17

## 2021-02-17 RX ORDER — GABAPENTIN 400 MG/1
0 CAPSULE ORAL
Qty: 0 | Refills: 0 | DISCHARGE

## 2021-02-17 RX ORDER — HEPARIN SODIUM 5000 [USP'U]/ML
5000 INJECTION INTRAVENOUS; SUBCUTANEOUS EVERY 8 HOURS
Refills: 0 | Status: DISCONTINUED | OUTPATIENT
Start: 2021-02-17 | End: 2021-02-18

## 2021-02-17 RX ORDER — PREGABALIN 225 MG/1
0 CAPSULE ORAL
Qty: 0 | Refills: 0 | DISCHARGE

## 2021-02-17 RX ORDER — APIXABAN 2.5 MG/1
1 TABLET, FILM COATED ORAL
Qty: 0 | Refills: 0 | DISCHARGE

## 2021-02-17 RX ORDER — ACETAMINOPHEN 500 MG
975 TABLET ORAL EVERY 8 HOURS
Refills: 0 | Status: DISCONTINUED | OUTPATIENT
Start: 2021-02-18 | End: 2021-02-18

## 2021-02-17 RX ORDER — PANTOPRAZOLE SODIUM 20 MG/1
40 TABLET, DELAYED RELEASE ORAL EVERY 24 HOURS
Refills: 0 | Status: DISCONTINUED | OUTPATIENT
Start: 2021-02-17 | End: 2021-02-18

## 2021-02-17 RX ORDER — HYOSCYAMINE SULFATE 0.13 MG
0.12 TABLET ORAL EVERY 4 HOURS
Refills: 0 | Status: DISCONTINUED | OUTPATIENT
Start: 2021-02-17 | End: 2021-02-18

## 2021-02-17 RX ORDER — ACETAMINOPHEN 500 MG
1000 TABLET ORAL ONCE
Refills: 0 | Status: COMPLETED | OUTPATIENT
Start: 2021-02-17 | End: 2021-02-17

## 2021-02-17 RX ORDER — SODIUM CHLORIDE 9 MG/ML
3 INJECTION INTRAMUSCULAR; INTRAVENOUS; SUBCUTANEOUS EVERY 8 HOURS
Refills: 0 | Status: DISCONTINUED | OUTPATIENT
Start: 2021-02-17 | End: 2021-02-17

## 2021-02-17 RX ORDER — CELECOXIB 200 MG/1
400 CAPSULE ORAL ONCE
Refills: 0 | Status: COMPLETED | OUTPATIENT
Start: 2021-02-17 | End: 2021-02-17

## 2021-02-17 RX ORDER — KETOROLAC TROMETHAMINE 30 MG/ML
15 SYRINGE (ML) INJECTION EVERY 8 HOURS
Refills: 0 | Status: DISCONTINUED | OUTPATIENT
Start: 2021-02-17 | End: 2021-02-18

## 2021-02-17 RX ORDER — ACETAMINOPHEN 500 MG
1000 TABLET ORAL ONCE
Refills: 0 | Status: DISCONTINUED | OUTPATIENT
Start: 2021-02-17 | End: 2021-02-18

## 2021-02-17 RX ORDER — SODIUM CHLORIDE 9 MG/ML
1000 INJECTION, SOLUTION INTRAVENOUS
Refills: 0 | Status: DISCONTINUED | OUTPATIENT
Start: 2021-02-17 | End: 2021-02-17

## 2021-02-17 RX ORDER — CEFAZOLIN SODIUM 1 G
2000 VIAL (EA) INJECTION EVERY 8 HOURS
Refills: 0 | Status: COMPLETED | OUTPATIENT
Start: 2021-02-17 | End: 2021-02-18

## 2021-02-17 RX ORDER — DIPHENOXYLATE HCL/ATROPINE 2.5-.025MG
0 TABLET ORAL
Qty: 0 | Refills: 0 | DISCHARGE

## 2021-02-17 RX ORDER — CHOLECALCIFEROL (VITAMIN D3) 125 MCG
0 CAPSULE ORAL
Qty: 0 | Refills: 0 | DISCHARGE

## 2021-02-17 RX ORDER — BUPIVACAINE 13.3 MG/ML
20 INJECTION, SUSPENSION, LIPOSOMAL INFILTRATION ONCE
Refills: 0 | Status: DISCONTINUED | OUTPATIENT
Start: 2021-02-17 | End: 2021-02-17

## 2021-02-17 RX ORDER — ACETAMINOPHEN 500 MG
1000 TABLET ORAL ONCE
Refills: 0 | Status: COMPLETED | OUTPATIENT
Start: 2021-02-18 | End: 2021-02-18

## 2021-02-17 RX ORDER — LOPERAMIDE HCL 2 MG
1 TABLET ORAL
Qty: 0 | Refills: 0 | DISCHARGE

## 2021-02-17 RX ORDER — LORATADINE 10 MG/1
1 TABLET ORAL
Qty: 0 | Refills: 0 | DISCHARGE

## 2021-02-17 RX ORDER — CEFAZOLIN SODIUM 1 G
2000 VIAL (EA) INJECTION ONCE
Refills: 0 | Status: COMPLETED | OUTPATIENT
Start: 2021-02-17 | End: 2021-02-17

## 2021-02-17 RX ADMIN — Medication 975 MILLIGRAM(S): at 08:25

## 2021-02-17 RX ADMIN — Medication 100 MILLIGRAM(S): at 10:14

## 2021-02-17 RX ADMIN — CELECOXIB 400 MILLIGRAM(S): 200 CAPSULE ORAL at 08:25

## 2021-02-17 RX ADMIN — SODIUM CHLORIDE 250 MILLILITER(S): 9 INJECTION, SOLUTION INTRAVENOUS at 14:31

## 2021-02-17 RX ADMIN — Medication 400 MILLIGRAM(S): at 15:44

## 2021-02-17 RX ADMIN — Medication 100 MILLIGRAM(S): at 18:25

## 2021-02-17 RX ADMIN — PANTOPRAZOLE SODIUM 40 MILLIGRAM(S): 20 TABLET, DELAYED RELEASE ORAL at 15:14

## 2021-02-17 RX ADMIN — Medication 15 MILLIGRAM(S): at 15:45

## 2021-02-17 RX ADMIN — ONDANSETRON 4 MILLIGRAM(S): 8 TABLET, FILM COATED ORAL at 23:24

## 2021-02-17 RX ADMIN — HEPARIN SODIUM 5000 UNIT(S): 5000 INJECTION INTRAVENOUS; SUBCUTANEOUS at 21:55

## 2021-02-17 RX ADMIN — SODIUM CHLORIDE 125 MILLILITER(S): 9 INJECTION, SOLUTION INTRAVENOUS at 20:14

## 2021-02-17 RX ADMIN — GABAPENTIN 300 MILLIGRAM(S): 400 CAPSULE ORAL at 08:25

## 2021-02-17 NOTE — BRIEF OPERATIVE NOTE - OPERATION/FINDINGS
Large paraesophageal hernia with large hernia sac. stomach omentum and colon all reduced into intra abdominal space. Right and left obed identified and edges cleared. 40F visiG tube passed. Partial amputation of hernia sac. Crura repair with 0 v lock and multiple ethibond sutures. Right side crura repair reinforced with ovitex mesh and secured with vicryl suture material, Gastropexy performed.  11 step port site fascia closed using 0 vicryl and all other port sites closed with monocryl and steris.

## 2021-02-17 NOTE — BRIEF OPERATIVE NOTE - COMMENTS
foregut hiatal hernia repair  no laying flat   anti emetics ATC  wound class II foregut hiatal hernia repair  no laying flat   anti emetics ATC  wound class I

## 2021-02-17 NOTE — BRIEF OPERATIVE NOTE - NSICDXBRIEFPROCEDURE_GEN_ALL_CORE_FT
PROCEDURES:  Robot-assisted gastropexy 17-Feb-2021 17:05:55  Honey Evans  Robot-assisted repair of sliding hiatal hernia 17-Feb-2021 17:05:46  Honey Evans

## 2021-02-18 ENCOUNTER — TRANSCRIPTION ENCOUNTER (OUTPATIENT)
Age: 71
End: 2021-02-18

## 2021-02-18 VITALS — WEIGHT: 241.85 LBS

## 2021-02-18 LAB
ANION GAP SERPL CALC-SCNC: 12 MMOL/L — SIGNIFICANT CHANGE UP (ref 5–17)
BASOPHILS # BLD AUTO: 0.01 K/UL — SIGNIFICANT CHANGE UP (ref 0–0.2)
BASOPHILS NFR BLD AUTO: 0.1 % — SIGNIFICANT CHANGE UP (ref 0–2)
BUN SERPL-MCNC: 16 MG/DL — SIGNIFICANT CHANGE UP (ref 8–20)
CALCIUM SERPL-MCNC: 8.3 MG/DL — LOW (ref 8.6–10.2)
CHLORIDE SERPL-SCNC: 108 MMOL/L — HIGH (ref 98–107)
CO2 SERPL-SCNC: 22 MMOL/L — SIGNIFICANT CHANGE UP (ref 22–29)
CREAT SERPL-MCNC: 0.84 MG/DL — SIGNIFICANT CHANGE UP (ref 0.5–1.3)
EOSINOPHIL # BLD AUTO: 0 K/UL — SIGNIFICANT CHANGE UP (ref 0–0.5)
EOSINOPHIL NFR BLD AUTO: 0 % — SIGNIFICANT CHANGE UP (ref 0–6)
GLUCOSE SERPL-MCNC: 107 MG/DL — HIGH (ref 70–99)
HCT VFR BLD CALC: 39.7 % — SIGNIFICANT CHANGE UP (ref 39–50)
HGB BLD-MCNC: 13 G/DL — SIGNIFICANT CHANGE UP (ref 13–17)
IMM GRANULOCYTES NFR BLD AUTO: 0.3 % — SIGNIFICANT CHANGE UP (ref 0–1.5)
LYMPHOCYTES # BLD AUTO: 0.69 K/UL — LOW (ref 1–3.3)
LYMPHOCYTES # BLD AUTO: 7.7 % — LOW (ref 13–44)
MAGNESIUM SERPL-MCNC: 2 MG/DL — SIGNIFICANT CHANGE UP (ref 1.6–2.6)
MCHC RBC-ENTMCNC: 32.7 GM/DL — SIGNIFICANT CHANGE UP (ref 32–36)
MCHC RBC-ENTMCNC: 33.2 PG — SIGNIFICANT CHANGE UP (ref 27–34)
MCV RBC AUTO: 101.3 FL — HIGH (ref 80–100)
MONOCYTES # BLD AUTO: 0.94 K/UL — HIGH (ref 0–0.9)
MONOCYTES NFR BLD AUTO: 10.5 % — SIGNIFICANT CHANGE UP (ref 2–14)
NEUTROPHILS # BLD AUTO: 7.28 K/UL — SIGNIFICANT CHANGE UP (ref 1.8–7.4)
NEUTROPHILS NFR BLD AUTO: 81.4 % — HIGH (ref 43–77)
PHOSPHATE SERPL-MCNC: 3.1 MG/DL — SIGNIFICANT CHANGE UP (ref 2.4–4.7)
PLATELET # BLD AUTO: 211 K/UL — SIGNIFICANT CHANGE UP (ref 150–400)
POTASSIUM SERPL-MCNC: 3.8 MMOL/L — SIGNIFICANT CHANGE UP (ref 3.5–5.3)
POTASSIUM SERPL-SCNC: 3.8 MMOL/L — SIGNIFICANT CHANGE UP (ref 3.5–5.3)
RBC # BLD: 3.92 M/UL — LOW (ref 4.2–5.8)
RBC # FLD: 13 % — SIGNIFICANT CHANGE UP (ref 10.3–14.5)
SODIUM SERPL-SCNC: 142 MMOL/L — SIGNIFICANT CHANGE UP (ref 135–145)
WBC # BLD: 8.95 K/UL — SIGNIFICANT CHANGE UP (ref 3.8–10.5)
WBC # FLD AUTO: 8.95 K/UL — SIGNIFICANT CHANGE UP (ref 3.8–10.5)

## 2021-02-18 RX ORDER — DENOSUMAB 60 MG/ML
0 INJECTION SUBCUTANEOUS
Qty: 0 | Refills: 0 | DISCHARGE

## 2021-02-18 RX ORDER — METHOTREXATE 2.5 MG/1
8 TABLET ORAL
Qty: 0 | Refills: 0 | DISCHARGE

## 2021-02-18 RX ORDER — SODIUM CHLORIDE 9 MG/ML
1000 INJECTION, SOLUTION INTRAVENOUS ONCE
Refills: 0 | Status: COMPLETED | OUTPATIENT
Start: 2021-02-18 | End: 2021-02-18

## 2021-02-18 RX ORDER — APIXABAN 2.5 MG/1
5 TABLET, FILM COATED ORAL
Refills: 0 | Status: DISCONTINUED | OUTPATIENT
Start: 2021-02-18 | End: 2021-02-18

## 2021-02-18 RX ORDER — TAMSULOSIN HYDROCHLORIDE 0.4 MG/1
0.4 CAPSULE ORAL ONCE
Refills: 0 | Status: COMPLETED | OUTPATIENT
Start: 2021-02-18 | End: 2021-02-18

## 2021-02-18 RX ORDER — METOPROLOL TARTRATE 50 MG
1 TABLET ORAL
Qty: 0 | Refills: 0 | DISCHARGE

## 2021-02-18 RX ORDER — POTASSIUM CHLORIDE 20 MEQ
20 PACKET (EA) ORAL ONCE
Refills: 0 | Status: COMPLETED | OUTPATIENT
Start: 2021-02-18 | End: 2021-02-18

## 2021-02-18 RX ADMIN — TAMSULOSIN HYDROCHLORIDE 0.4 MILLIGRAM(S): 0.4 CAPSULE ORAL at 08:02

## 2021-02-18 RX ADMIN — SODIUM CHLORIDE 1000 MILLILITER(S): 9 INJECTION, SOLUTION INTRAVENOUS at 05:31

## 2021-02-18 RX ADMIN — Medication 50 MILLIEQUIVALENT(S): at 08:02

## 2021-02-18 RX ADMIN — HEPARIN SODIUM 5000 UNIT(S): 5000 INJECTION INTRAVENOUS; SUBCUTANEOUS at 05:37

## 2021-02-18 RX ADMIN — ONDANSETRON 4 MILLIGRAM(S): 8 TABLET, FILM COATED ORAL at 05:37

## 2021-02-18 RX ADMIN — SODIUM CHLORIDE 1000 MILLILITER(S): 9 INJECTION, SOLUTION INTRAVENOUS at 03:35

## 2021-02-18 RX ADMIN — SODIUM CHLORIDE 125 MILLILITER(S): 9 INJECTION, SOLUTION INTRAVENOUS at 08:05

## 2021-02-18 RX ADMIN — Medication 400 MILLIGRAM(S): at 06:25

## 2021-02-18 RX ADMIN — Medication 100 MILLIGRAM(S): at 01:52

## 2021-02-18 NOTE — DISCHARGE NOTE PROVIDER - NSDCMRMEDTOKEN_GEN_ALL_CORE_FT
acetaminophen 160 mg/5 mL oral liquid: 30 milliliter(s) orally once a day   Caltrate 600 + D oral tablet: 1 tab(s) orally 2 times a day  Claritin 10 mg oral tablet: 1 tab(s) orally once a day, As Needed  Eliquis 5 mg oral tablet: 1 tab(s) orally 2 times a day  Flexeril 5 mg oral tablet: orally once a day  folic acid 1 mg oral tablet: 1 tab(s) orally once a day  gabapentin 300 mg oral tablet: orally 2 times a day  Imodium 2 mg oral capsule: 1 cap(s) orally every 4 hours, As Needed  Levsin SL 0.125 mg sublingual tablet: 1 tab(s) sublingually every 6 hours, As Needed  gastric spasm  Lomotil 2.5 mg-0.025 mg oral tablet:   methotrexate: 8 milligram(s) orally once a day  hold for at least two weeks post op   omeprazole 40 mg oral delayed release capsule: 1 opened cap(s) orally once a day   ondansetron 4 mg oral tablet, disintegratin tab(s) orally every 6 hours   oxyCODONE 5 mg oral tablet: 1 tab(s) orally every 6 hours, As Needed  Prolia: subcutaneous every 3 months  will follow up with outpatient MD, must wait at least two weeks after surgery for next injection   tamsulosin 0.4 mg oral capsule: orally 2 times a day  Vitamin B12: orally once a day  Vitamin D3 2000 intl units (50 mcg) oral tablet: orally once a day

## 2021-02-18 NOTE — DIETITIAN NUTRITION RISK NOTIFICATION - ETIOLOGY-BASIS
53 year old male hx DVT's on eliquis, DM, HTN, CKD presenting with cellulitis x 3 days. patient denies falls/trauma. States he began having increased pain, worsening, no pall/prov factors, at his LLE. He admits to ass. rash and swelling. Pt seen at [facility] today for vasc duplex which was neg for new dvt; showed chronic thrrombus unchanged from previous scans. Patient admits to a few episodes of n/v the past 2 days but nothing today. He denies pus, break in skin, outpatient abx use. Patient had similar issue in august and was admitted for iv abx. Patient sent in by [facility] for eval. Tmax of 100.0 at home. Denies headache, dizziness, chest pain, cough, shortness of breath, abdominal pain, nausea, vomiting, diarrhea. Chronic illness

## 2021-02-18 NOTE — DISCHARGE NOTE PROVIDER - CARE PROVIDER_API CALL
Darion Carty)  Surgery  [address] [place] [id number]  Phone: [phone]  Fax: [phone]  Follow Up Time:

## 2021-02-18 NOTE — DIETITIAN INITIAL EVALUATION ADULT. - PERTINENT MEDS FT
MEDICATIONS  (STANDING):  acetaminophen    Suspension .. 975 milliGRAM(s) Oral every 8 hours  acetaminophen  IVPB .. 1000 milliGRAM(s) IV Intermittent once  apixaban 5 milliGRAM(s) Oral two times a day  lactated ringers. 1000 milliLiter(s) (125 mL/Hr) IV Continuous <Continuous>  ondansetron Injectable 4 milliGRAM(s) IV Push every 6 hours  pantoprazole  Injectable 40 milliGRAM(s) IV Push every 24 hours  sodium chloride 0.9% 1000 milliLiter(s) (250 mL/Hr) IV Continuous <Continuous>    MEDICATIONS  (PRN):  hyoscyamine SL 0.125 milliGRAM(s) SubLingual every 4 hours PRN Gastric Spasms  LORazepam   Injectable 0.5 milliGRAM(s) IV Push once PRN Esophageal Spasm  metoclopramide Injectable 10 milliGRAM(s) IV Push every 6 hours PRN nausea

## 2021-02-18 NOTE — DISCHARGE NOTE NURSING/CASE MANAGEMENT/SOCIAL WORK - PATIENT PORTAL LINK FT
You can access the FollowMyHealth Patient Portal offered by [facility] by registering at the following website: [email or web address]. By joining [employer]’s FollowMyHealth portal, you will also be able to view your health information using other applications (apps) compatible with our system.

## 2021-02-18 NOTE — DIETITIAN INITIAL EVALUATION ADULT. - ETIOLOGY
related to inability to meet sufficient protein-energy in setting of prostate cancer, decreased po intake due to abdominal pain, now s/p lap paraesophageal hernia repair with rosana fundoplication

## 2021-02-18 NOTE — DISCHARGE NOTE PROVIDER - NSDCCPTREATMENT_GEN_ALL_CORE_FT
PRINCIPAL PROCEDURE  Procedure: Robot-assisted laparoscopic repair of paraesophageal hernia with partial fundoplication  Findings and Treatment: Repair with mesh, Vadim funtoplication and gastropexy

## 2021-02-18 NOTE — PROGRESS NOTE ADULT - SUBJECTIVE AND OBJECTIVE BOX
Bariatric Surgery Progress Note  Patient afebrile, Uop only 200cc since start of night shift and pt with increased Cr from baseline 0.75 -> 1.07 on postop labs with BP 97/63. 1L bolus given. Pt tolerating PO intake. Denies N/V/F/C/CP/SOB       Diet, Clear Liquid:   Supplement Feeding Modality:  Oral  Ensure Enlive Cans or Servings Per Day:  3       Frequency:  Three Times a day (02-17-21 @ 17:38)      Scheduled Medications:   acetaminophen    Suspension .. 975 milliGRAM(s) Oral every 8 hours  acetaminophen  IVPB .. 1000 milliGRAM(s) IV Intermittent once  acetaminophen  IVPB .. 1000 milliGRAM(s) IV Intermittent once  heparin   Injectable 5000 Unit(s) SubCutaneous every 8 hours  ketorolac   Injectable 15 milliGRAM(s) IV Push every 8 hours  lactated ringers Bolus 1000 milliLiter(s) IV Bolus once  lactated ringers. 1000 milliLiter(s) (125 mL/Hr) IV Continuous <Continuous>  ondansetron Injectable 4 milliGRAM(s) IV Push every 6 hours  pantoprazole  Injectable 40 milliGRAM(s) IV Push every 24 hours  sodium chloride 0.9% 1000 milliLiter(s) (250 mL/Hr) IV Continuous <Continuous>    PRN Medications:  hyoscyamine SL 0.125 milliGRAM(s) SubLingual every 4 hours PRN Gastric Spasms  LORazepam   Injectable 0.5 milliGRAM(s) IV Push once PRN Esophageal Spasm  metoclopramide Injectable 10 milliGRAM(s) IV Push every 6 hours PRN nausea      Objective:   T(F): 98.1 (02-18 @ 00:00), Max: 98.7 (02-17 @ 16:47)  HR: 75 (02-18 @ 00:00) (62 - 96)  BP: 97/63 (02-18 @ 00:00) (96/77 - 122/77)  BP(mean): --  ABP: --  ABP(mean): --  RR: 18 (02-18 @ 00:00) (14 - 20)  SpO2: 96% (02-18 @ 00:00) (95% - 100%)      Physical Exam:   GEN: patient resting comfortably in bed, in no acute distress  RESP: respirations are unlabored, no accessory muscle use, no conversational dyspnea  CVS: RRR  GI: Abdomen soft, non-tender, non-distended, no rebound tenderness / guarding. Incisions well approximated w/o erythema or drainage.     I&O's    02-17 @ 07:01  -  02-18 @ 03:32  --------------------------------------------------------  IN:    Lactated Ringers: 500 mL    Oral Fluid: 300 mL    sodium chloride 0.9% w/ Additives: 500 mL  Total IN: 1300 mL    OUT:    Voided (mL): 200 mL  Total OUT: 200 mL    Total NET: 1100 mL          LABS:                        13.5   14.69 )-----------( 207      ( 17 Feb 2021 14:32 )             43.0     02-17    141  |  109<H>  |  20.0  ----------------------------<  173<H>  4.2   |  22.0  |  1.07    Ca    8.6      17 Feb 2021 14:32      PT/INR - ( 17 Feb 2021 08:43 )   PT: 11.7 sec;   INR: 1.01 ratio         PTT - ( 17 Feb 2021 08:43 )  PTT:37.1 sec      MICROBIOLOGY:       PATHOLOGY:      
Subjective: Patient seen and examined post op. Doing well. No complaints. Pain well controlled. Denies n/v. Feels hungry.        STATUS POST:  robotic hiatal hernia repair with gastropexy     POST OPERATIVE DAY #: 0    MEDICATIONS  (STANDING):  ceFAZolin   IVPB 2000 milliGRAM(s) IV Intermittent every 8 hours  heparin   Injectable 5000 Unit(s) SubCutaneous every 8 hours  ketorolac   Injectable 15 milliGRAM(s) IV Push every 8 hours  lactated ringers. 1000 milliLiter(s) (125 mL/Hr) IV Continuous <Continuous>  ondansetron Injectable 4 milliGRAM(s) IV Push every 6 hours  pantoprazole  Injectable 40 milliGRAM(s) IV Push every 24 hours  sodium chloride 0.9% 1000 milliLiter(s) (250 mL/Hr) IV Continuous <Continuous>    MEDICATIONS  (PRN):  hyoscyamine SL 0.125 milliGRAM(s) SubLingual every 4 hours PRN Gastric Spasms  LORazepam   Injectable 0.5 milliGRAM(s) IV Push once PRN Esophageal Spasm  metoclopramide Injectable 10 milliGRAM(s) IV Push every 6 hours PRN nausea      Vital Signs Last 24 Hrs  T(C): 37.1 (17 Feb 2021 16:47), Max: 37.1 (17 Feb 2021 16:47)  T(F): 98.7 (17 Feb 2021 16:47), Max: 98.7 (17 Feb 2021 16:47)  HR: 73 (17 Feb 2021 16:47) (62 - 96)  BP: 109/68 (17 Feb 2021 16:47) (96/77 - 122/77)  BP(mean): --  RR: 17 (17 Feb 2021 15:53) (14 - 20)  SpO2: 100% (17 Feb 2021 16:47) (95% - 100%)    Physical Exam:    Constitutional: NAD  HEENT: PERRL, EOMI  Neck: No JVD, FROM without pain  Respiratory: Breath Sounds equal & clear to auscultation, no accessory muscle use  Cardiovascular: Regular rate & rhythm, S1, S2  Gastrointestinal: Soft, non-tender, dressing CDI   Extremities: No peripheral edema, No cyanosis  Neurological: A&O x 3; without gross deficit, GCS: 15  Musculoskeletal: No joint pain, swelling, deformity, or point tenderness; no limitation of movement      LABS:                        13.5   14.69 )-----------( 207      ( 17 Feb 2021 14:32 )             43.0     02-17    141  |  109<H>  |  20.0  ----------------------------<  173<H>  4.2   |  22.0  |  1.07    Ca    8.6      17 Feb 2021 14:32      PT/INR - ( 17 Feb 2021 08:43 )   PT: 11.7 sec;   INR: 1.01 ratio         PTT - ( 17 Feb 2021 08:43 )  PTT:37.1 sec

## 2021-02-18 NOTE — DISCHARGE NOTE PROVIDER - NSDCCPCAREPLAN_GEN_ALL_CORE_FT
PRINCIPAL DISCHARGE DIAGNOSIS  Diagnosis: S/P repair of paraesophageal hernia  Assessment and Plan of Treatment: .Follow up: Please call and make an appointment with Dr. Carty in 1-2 weeks. Also, please call and make an appointment with your primary care physician as per your usual schedule.   Activity: May return to normal activities as tolerated, however refrain from heavy lifting > 10-15 lbs.  Diet: May continue regular diet.  Medications: Please take all medications listed on your discharge paperwork as prescribed. Pain medication has been prescribed for you. Please, take it as it has been prescribed, do not drive or operate heavy machinery while taking narcotics.  You are encouraged to take over-the-counter tylenol and/or ibuprofen for pain relief when you feel your pain no longer warrants the use of narcotic pain medications, however DO NOT TAKE percocet and tylenol at the same time as they contain the same active ingredient (acetaminophen). Take only percocet OR tylenol.  Wound Care: Please, keep wound site clean and dry. You may shower, but do not bathe.  Patient is advised to RETURN TO THE EMERGENCY DEPARTMENT for any of the following - worsening pain, fever/chills, nausea/vomiting, altered mental status, chest pain, shortness of breath, or any other new / worsening symptom.         PRINCIPAL DISCHARGE DIAGNOSIS  Diagnosis: S/P repair of paraesophageal hernia  Assessment and Plan of Treatment: .Follow up: Please call and make an appointment with Dr. Carty in 1-2 weeks. Also, please call and make an appointment with your primary care physician as per your usual schedule.   Activity: May return to normal activities as tolerated, however refrain from heavy lifting > 10-15 lbs. DO NOT LAY FLAT IN BED   Diet: FULL LIQUIDS ONLY until followup. Small meals/sips to prevent nausea vomiting   Medications: Please take all medications listed on your discharge paperwork as prescribed. Pain medication has been prescribed for you. Please, take it as it has been prescribed, do not drive or operate heavy machinery while taking narcotics.  You are encouraged to take over-the-counter tylenol and/or ibuprofen for pain relief when you feel your pain no longer warrants the use of narcotic pain medications, however DO NOT TAKE percocet and tylenol at the same time as they contain the same active ingredient (acetaminophen). Take only percocet OR tylenol.  Wound Care: Please, keep wound site clean and dry. You may shower, but do not bathe.  Patient is advised to RETURN TO THE EMERGENCY DEPARTMENT for any of the following - worsening pain, fever/chills, nausea/vomiting, altered mental status, chest pain, shortness of breath, or any other new / worsening symptom.

## 2021-02-18 NOTE — DISCHARGE NOTE PROVIDER - HOSPITAL COURSE
71 yo male admitted 2/17 for robotic repair of paraesophageal hernia with mesh and rosana fundoplication. 69 yo male admitted 2/17 for robotic repair of paraesophageal hernia with mesh and rosana fundoplication.  MIS ERAS protocol followed to include preoperative and perioperative use of DVT and SSI prophylaxis as well as multi-modal non-opioid analgesia. Patient tolerated the procedure well  extubated in the operating room then transferred to the PACU in stable condition. Once hemodynamically stable and effective pain control the patient was able to ambulate with assistance. Pt was also able to void within 4 hours following the procedure. The patient was later transferred to a surgery unit and placed on bedside continuous pulse oximetry. Pain managment included IV multi-modal non-opioid analgesia then transitioned to liquid as needed. The patient tolerated clear liquid followed by FLD.     On POD #1 patient remained stable with no acute events overnight, has effective  pain control. Denies nausea and vomiting. Patient is ambulating independently and voiding as expected. The rest of the hospital course was uneventful,  subsequently cleared for discharge home on POD#1.  Patient will follow-up with Dr. Carty in 10-14 days, medical doctor 30 days. All appropriate [facility] obtained from [facility] prior to discharge. Written discharge instruction explained and given. Patient will continue with FLD until follow up.

## 2021-02-18 NOTE — DISCHARGE NOTE PROVIDER - DETAILS OF MALNUTRITION DIAGNOSIS/DIAGNOSES
This patient has been assessed with a concern for Malnutrition and was treated during this hospitalization for the following Nutrition diagnosis/diagnoses:     -  02/18/2021: Severe protein-calorie malnutrition

## 2021-02-18 NOTE — DIETITIAN INITIAL EVALUATION ADULT. - PERTINENT LABORATORY DATA
02-18 Na142 mmol/L Glu 107 mg/dL<H> K+ 3.8 mmol/L Cr  0.84 mg/dL BUN 16.0 mg/dL Phos 3.1 mg/dL Alb n/a   PAB n/a

## 2021-02-18 NOTE — DIETITIAN NUTRITION RISK NOTIFICATION - ADDITIONAL COMMENTS/DIETITIAN RECOMMENDATIONS
Continue full liquid diet as tolerated + Ensure Enlive TID to optimize po intake and provide an additional 350 kcal, 20g protein per serving.  Rx: MVI, vitamin D, vitamin B12, thiamine and folic acid supplementation.   Encourage po intake, monitor diet tolerance, and provide assistance at meals as needed.  Obtain daily weights to monitor trends.

## 2021-02-18 NOTE — DIETITIAN INITIAL EVALUATION ADULT. - OTHER INFO
70 year old male presents to PST today. Patient reports a history of hiatal hernia for about 30 to 40 years. Patient reports that he has prostate cancer and can no longer eat regular meals causing him to lose weight and causing discomfort in the abdomen. Denies taking any pain medications for this discomfort. c/o diarrhea related to prostate cancer. C/o SOB with exertion at times. Pt now s/p laparoscopic paraesophageal hernia repair with rosana fundoplication.     Pt reports prolonged decreased appetite/po intake due to abdominal pain. States when he was first diagnosed with cancer ~5 years ago his weight was 242 lbs and confirms significant weight loss since. Reports he has tried to maintain or gain weight but has been unable to do so. Pt currently on CLD, tolerating small amounts. Consuming Ensure Enlive with fair intake. Diet just advanced to full liquids; aware pt to be discharged home on full liquid diet, provided with verbal/written literature and emphasized the importance of continued intake of oral nutrition supplements.

## 2021-02-18 NOTE — PROGRESS NOTE ADULT - ATTENDING COMMENTS
POD1, tolerating diet, pain well controlled  Abdomen soft, nontender  Labs reviewed   Plan for discharge on full liquid diet when criteria are met

## 2021-02-18 NOTE — PROGRESS NOTE ADULT - ASSESSMENT
70M s/p robotic hiatal hernia repair with gastropexy   -post op labs and VS reviewed, acceptable   -may start clears now with protein shakes   -pain control with tylenol and toradol  -anti spasmodics prn   -heparin and scd for dvt ppx, will restart elliquis POD #1  -ambulate  -am labs prep for d/c  -antiemetic ATC   -head elevated while in bed   
69yo male s/p 2/17 laparoscopic paraesophageal hernia repair with rosana fundoplication.      - f/u am labs and trend Cr  - f/u Uop  - contt clears now with protein shakes   - pain control with tylenol and toradol  - heparin and scd for dvt ppx, will restart elliquis today if H/H stable  - ambulate  - antiemetic ATC   - head elevated while in bed

## 2021-02-18 NOTE — DIETITIAN INITIAL EVALUATION ADULT. - ADD RECOMMEND
Rx: MVI, vitamin D, vitamin B12, thiamine and folic acid supplementation. Encourage po intake, monitor diet tolerance, and provide assistance at meals as needed. Obtain daily weights to monitor trends.

## 2021-02-21 ENCOUNTER — TRANSCRIPTION ENCOUNTER (OUTPATIENT)
Age: 71
End: 2021-02-21

## 2021-02-24 LAB — SURGICAL PATHOLOGY STUDY: SIGNIFICANT CHANGE UP

## 2021-02-25 NOTE — CDI QUERY NOTE - NSCDIOTHERTXTBX_GEN_ALL_CORE_HH
Severe protein calorie malnutrition was documented for this patient can you please specify the clinical criteria that will support these findings? A Registered dietitian notes is not sufficient if not co-signed by a provider.   Also in the H&P PST physical exam it was noted well- nourished which contradicts the malnutrition diagnosis, please clarify.    A.	Patient is not well-nourished  Upon Nutritional Assessment by the Registered Dietitian Your Patient was Determined to Meet Criteria/Has Evidence of the Following Diagnosis:	Severe protein-calorie malnutrition  Other Risk Factors	Not applicable  Etiology-Basis	Chronic illness  Malnutrition Evaluation as Demonstrated by (Adults > 20 years of age)	Inadequate energy intake...  Loss of subcutaneous fat...  Loss of muscle...  Inadequate Energy Intake	< 75% for >/= 1 month  Body Fat (loss of subcutaneous fat)	Orbital...  Triceps...  Buccal...  Orbital Depletion is	severe  Triceps Depletion is	severe  Buccal Depletion is	severe  Muscle Mass (Loss of Muscle)	Temples...  Clavicles...  Shoulders...  Temples Depletion is	severe  Clavicles Depletion is	severe  Shoulders Depletion is	severe    B.	Other, please specify  C.	Not clinically significant      Supporting Documentations:  2/8/21 H&P PST:  Physical Exam:  • Constitutional	Well-developed, well nourished      2/18/21 Dietitian Nutrition Risk Notification:  Dietitian Nutritition Risk Notification:    Nutrition Diagnosis & Parameters:  Findings Based on Comprehensive Nutrition Assessment, Consultation Performed on	18-Feb-2021  Upon Nutritional Assessment by the Registered Dietitian Your Patient was Determined to Meet Criteria/Has Evidence of the Following Diagnosis:	Severe protein-calorie malnutrition  Other Risk Factors	Not applicable  Etiology-Basis	Chronic illness  Malnutrition Evaluation as Demonstrated by (Adults > 20 years of age)	Inadequate energy intake...  Loss of subcutaneous fat...  Loss of muscle...  Inadequate Energy Intake	< 75% for >/= 1 month  Body Fat (loss of subcutaneous fat)	Orbital...  Triceps...  Buccal...  Orbital Depletion is	severe  Triceps Depletion is	severe  Buccal Depletion is	severe  Muscle Mass (Loss of Muscle)	Temples...  Clavicles...  Shoulders...  Temples Depletion is	severe  Clavicles Depletion is	severe  Shoulders Depletion is	severe     Nutrition Interventions:  Treatment: The Following Diet Has Been Recommended	  Additional Comments/Dietitian Recommendations	Continue full liquid diet as tolerated + Ensure Enlive TID to optimize po intake and provide an additional 350 kcal, 20g protein per serving.  Rx: MVI, vitamin D, vitamin B12, thiamine and folic acid supplementation.   Encourage po intake, monitor diet tolerance, and provide assistance at meals as needed.  Obtain daily weights to monitor trends.     Provider Attestation:  [email or web address]	By signing this assessment you are acknowledging and agree with the diagnosis/diagnoses assigned by the Registered Dietitian        Electronic Signatures:  Renetta Sanchez (Registered Dietitian)   (Signed 18-Feb-2021 09:15)  	Authored: Nutrition Diagnosis & Parameters, Nutrition Interventions, Provider Attestation    Last Updated: 18-Feb-2021 09:42 by Cheri Gould)

## 2021-02-25 NOTE — CHART NOTE - NSCHARTNOTEFT_GEN_A_CORE
Please note upon review of chart the following diagnosis exists for this patient:    1. Severe protein calorie malnutrition      Upon Nutritional Assessment by the Registered Dietitian Your Patient was Determined to Meet Criteria/Has Evidence of the Following Diagnosis:	Severe protein-calorie malnutrition  Other Risk Factors	Not applicable  Etiology-Basis	Chronic illness  Malnutrition Evaluation as Demonstrated by (Adults > 20 years of age)	Inadequate energy intake...  Loss of subcutaneous fat...  Loss of muscle...  Inadequate Energy Intake	< 75% for >/= 1 month  Body Fat (loss of subcutaneous fat)	Orbital...  Triceps...  Buccal...  Orbital Depletion is	severe  Triceps Depletion is	severe  Buccal Depletion is	severe  Muscle Mass (Loss of Muscle)	Temples...  Clavicles...  Shoulders...  Temples Depletion is	severe  Clavicles Depletion is	severe  Shoulders Depletion is	severe      Supporting Documentations:  2/8/21 H&P PST:  Physical Exam:  • Constitutional	Well-developed, well nourished      2/18/21 Dietitian Nutrition Risk Notification:  Dietitian Nutritition Risk Notification:    Nutrition Diagnosis & Parameters:  Findings Based on Comprehensive Nutrition Assessment, Consultation Performed on	18-Feb-2021  Upon Nutritional Assessment by the Registered Dietitian Your Patient was Determined to Meet Criteria/Has Evidence of the Following Diagnosis:	Severe protein-calorie malnutrition  Other Risk Factors	Not applicable  Etiology-Basis	Chronic illness  Malnutrition Evaluation as Demonstrated by (Adults > 20 years of age)	Inadequate energy intake...  Loss of subcutaneous fat...  Loss of muscle...  Inadequate Energy Intake	< 75% for >/= 1 month  Body Fat (loss of subcutaneous fat)	Orbital...  Triceps...  Buccal...  Orbital Depletion is	severe  Triceps Depletion is	severe  Buccal Depletion is	severe  Muscle Mass (Loss of Muscle)	Temples...  Clavicles...  Shoulders...  Temples Depletion is	severe  Clavicles Depletion is	severe  Shoulders Depletion is	severe     Nutrition Interventions:  Treatment: The Following Diet Has Been Recommended	  Additional Comments/Dietitian Recommendations	Continue full liquid diet as tolerated + Ensure Enlive TID to optimize po intake and provide an additional 350 kcal, 20g protein per serving.  Rx: MVI, vitamin D, vitamin B12, thiamine and folic acid supplementation.   Encourage po intake, monitor diet tolerance, and provide assistance at meals as needed.  Obtain daily weights to monitor trends. Please note upon review of chart the following diagnosis exists for this patient:    1. Severe protein calorie malnutrition      Upon Nutritional Assessment by the Registered Dietitian Your Patient was Determined to Meet Criteria/Has Evidence of the Following Diagnosis:	Severe protein-calorie malnutrition  Other Risk Factors	Not applicable  Etiology-Basis	Chronic illness  Malnutrition Evaluation as Demonstrated by (Adults > 20 years of age)	Inadequate energy intake...  Loss of subcutaneous fat...  Loss of muscle...  Inadequate Energy Intake	< 75% for >/= 1 month  Body Fat (loss of subcutaneous fat)	Orbital...  Triceps...  Buccal...  Orbital Depletion is	severe  Triceps Depletion is	severe  Buccal Depletion is	severe  Muscle Mass (Loss of Muscle)	Temples...  Clavicles...  Shoulders...  Temples Depletion is	severe  Clavicles Depletion is	severe  Shoulders Depletion is	severe      Supporting Documentations:      2/18/21 Dietitian Nutrition Risk Notification:  Dietitian Nutritition Risk Notification:    Nutrition Diagnosis & Parameters:  Findings Based on Comprehensive Nutrition Assessment, Consultation Performed on	18-Feb-2021  Upon Nutritional Assessment by the Registered Dietitian Your Patient was Determined to Meet Criteria/Has Evidence of the Following Diagnosis:	Severe protein-calorie malnutrition  Other Risk Factors	Not applicable  Etiology-Basis	Chronic illness  Malnutrition Evaluation as Demonstrated by (Adults > 20 years of age)	Inadequate energy intake...  Loss of subcutaneous fat...  Loss of muscle...  Inadequate Energy Intake	< 75% for >/= 1 month  Body Fat (loss of subcutaneous fat)	Orbital...  Triceps...  Buccal...  Orbital Depletion is	severe  Triceps Depletion is	severe  Buccal Depletion is	severe  Muscle Mass (Loss of Muscle)	Temples...  Clavicles...  Shoulders...  Temples Depletion is	severe  Clavicles Depletion is	severe  Shoulders Depletion is	severe     Nutrition Interventions:  Treatment: The Following Diet Has Been Recommended	  Additional Comments/Dietitian Recommendations	Continue full liquid diet as tolerated + Ensure Enlive TID to optimize po intake and provide an additional 350 kcal, 20g protein per serving.  Rx: MVI, vitamin D, vitamin B12, thiamine and folic acid supplementation.   Encourage po intake, monitor diet tolerance, and provide assistance at meals as needed.  Obtain daily weights to monitor trends.

## 2021-03-01 ENCOUNTER — NON-APPOINTMENT (OUTPATIENT)
Age: 71
End: 2021-03-01

## 2021-03-04 ENCOUNTER — APPOINTMENT (OUTPATIENT)
Dept: SURGERY | Facility: CLINIC | Age: 71
End: 2021-03-04
Payer: MEDICARE

## 2021-03-04 VITALS
DIASTOLIC BLOOD PRESSURE: 64 MMHG | HEART RATE: 86 BPM | SYSTOLIC BLOOD PRESSURE: 98 MMHG | TEMPERATURE: 97.7 F | RESPIRATION RATE: 16 BRPM | HEIGHT: 76 IN | OXYGEN SATURATION: 95 %

## 2021-03-04 PROCEDURE — 99024 POSTOP FOLLOW-UP VISIT: CPT

## 2021-03-04 NOTE — PLAN
[FreeTextEntry1] : Con't soft diet, no bread/carbonation/raw vegetables\par no other restrictions\par f/u 1 month

## 2021-03-04 NOTE — PHYSICAL EXAM
[Normal Thyroid] : the thyroid was normal [Normal Breath Sounds] : Normal breath sounds [Normal Heart Sounds] : normal heart sounds [Normal Rate and Rhythm] : normal rate and rhythm [No Rash or Lesion] : No rash or lesion [Alert] : alert [Oriented to Person] : oriented to person [Oriented to Place] : oriented to place [Oriented to Time] : oriented to time [Calm] : calm [JVD] : no jugular venous distention  [Abdominal Masses] : No abdominal masses [Abdomen Tenderness] : ~T ~M No abdominal tenderness [Purpura] : no purpura  [Petechiae] : no petechiae [Skin Ulcer] : no ulcer [Skin Induration] : no induration [de-identified] : NAD  [de-identified] : PERRLA nonicteric [de-identified] : Normoactive bowel sounds, no hepatosplenomegaly, no masses, non-tender.  Healed wounds nontender.   [de-identified] : grossly intact

## 2021-03-04 NOTE — HISTORY OF PRESENT ILLNESS
[de-identified] : Mr. JAVIER CARBAJAL is a 70 year-old man who presented with hiatal hernia s/p Robot-assisted laparoscopic reduction and repair of type 4 paraesophageal hernia with mesh, gastropexy and anterior fundoplication on 2/17/2021 who comes in today for a post op visit. He is doing well. Denies pain. Denies fever or chills. Patient remains on soft diet, tolerating. Normal bowel movements.  No chest pain/shortness of breath, no fever/chills, no nausea/vomiting.  No reflux\par \par \par

## 2021-03-16 PROCEDURE — 85025 COMPLETE CBC W/AUTO DIFF WBC: CPT

## 2021-03-16 PROCEDURE — S2900: CPT

## 2021-03-16 PROCEDURE — 85730 THROMBOPLASTIN TIME PARTIAL: CPT

## 2021-03-16 PROCEDURE — 85610 PROTHROMBIN TIME: CPT

## 2021-03-16 PROCEDURE — 80048 BASIC METABOLIC PNL TOTAL CA: CPT

## 2021-03-16 PROCEDURE — 88302 TISSUE EXAM BY PATHOLOGIST: CPT

## 2021-03-16 PROCEDURE — C1781: CPT

## 2021-03-16 PROCEDURE — 84100 ASSAY OF PHOSPHORUS: CPT

## 2021-03-16 PROCEDURE — 83735 ASSAY OF MAGNESIUM: CPT

## 2021-03-16 PROCEDURE — 36415 COLL VENOUS BLD VENIPUNCTURE: CPT

## 2021-03-17 ENCOUNTER — TRANSCRIPTION ENCOUNTER (OUTPATIENT)
Age: 71
End: 2021-03-17

## 2021-03-18 ENCOUNTER — APPOINTMENT (OUTPATIENT)
Dept: SURGERY | Facility: CLINIC | Age: 71
End: 2021-03-18
Payer: MEDICARE

## 2021-03-18 ENCOUNTER — INPATIENT (INPATIENT)
Facility: HOSPITAL | Age: 71
LOS: 0 days | Discharge: HOME | DRG: 327 | End: 2021-03-19
Attending: SURGERY | Admitting: SURGERY
Payer: MEDICARE

## 2021-03-18 ENCOUNTER — TRANSCRIPTION ENCOUNTER (OUTPATIENT)
Age: 71
End: 2021-03-18

## 2021-03-18 VITALS
OXYGEN SATURATION: 93 % | TEMPERATURE: 97.4 F | HEIGHT: 76 IN | SYSTOLIC BLOOD PRESSURE: 148 MMHG | DIASTOLIC BLOOD PRESSURE: 87 MMHG | HEART RATE: 203 BPM

## 2021-03-18 VITALS
HEART RATE: 94 BPM | SYSTOLIC BLOOD PRESSURE: 122 MMHG | DIASTOLIC BLOOD PRESSURE: 84 MMHG | OXYGEN SATURATION: 98 % | HEIGHT: 76 IN | TEMPERATURE: 98 F | RESPIRATION RATE: 18 BRPM

## 2021-03-18 DIAGNOSIS — Z98.890 OTHER SPECIFIED POSTPROCEDURAL STATES: Chronic | ICD-10-CM

## 2021-03-18 DIAGNOSIS — K44.9 DIAPHRAGMATIC HERNIA WITHOUT OBSTRUCTION OR GANGRENE: ICD-10-CM

## 2021-03-18 DIAGNOSIS — Z90.89 ACQUIRED ABSENCE OF OTHER ORGANS: Chronic | ICD-10-CM

## 2021-03-18 LAB
ALBUMIN SERPL ELPH-MCNC: 4.2 G/DL — SIGNIFICANT CHANGE UP (ref 3.3–5.2)
ALP SERPL-CCNC: 85 U/L — SIGNIFICANT CHANGE UP (ref 40–120)
ALT FLD-CCNC: 14 U/L — SIGNIFICANT CHANGE UP
ANION GAP SERPL CALC-SCNC: 15 MMOL/L — SIGNIFICANT CHANGE UP (ref 5–17)
APTT BLD: 38.2 SEC — HIGH (ref 27.5–35.5)
AST SERPL-CCNC: 30 U/L — SIGNIFICANT CHANGE UP
BASOPHILS # BLD AUTO: 0 K/UL — SIGNIFICANT CHANGE UP (ref 0–0.2)
BASOPHILS NFR BLD AUTO: 0 % — SIGNIFICANT CHANGE UP (ref 0–2)
BILIRUB SERPL-MCNC: 0.6 MG/DL — SIGNIFICANT CHANGE UP (ref 0.4–2)
BLD GP AB SCN SERPL QL: SIGNIFICANT CHANGE UP
BUN SERPL-MCNC: 12 MG/DL — SIGNIFICANT CHANGE UP (ref 8–20)
CALCIUM SERPL-MCNC: 9 MG/DL — SIGNIFICANT CHANGE UP (ref 8.6–10.2)
CHLORIDE SERPL-SCNC: 100 MMOL/L — SIGNIFICANT CHANGE UP (ref 98–107)
CO2 SERPL-SCNC: 24 MMOL/L — SIGNIFICANT CHANGE UP (ref 22–29)
CREAT SERPL-MCNC: 0.75 MG/DL — SIGNIFICANT CHANGE UP (ref 0.5–1.3)
EOSINOPHIL # BLD AUTO: 0 K/UL — SIGNIFICANT CHANGE UP (ref 0–0.5)
EOSINOPHIL NFR BLD AUTO: 0 % — SIGNIFICANT CHANGE UP (ref 0–6)
GLUCOSE SERPL-MCNC: 112 MG/DL — HIGH (ref 70–99)
HCT VFR BLD CALC: 41.1 % — SIGNIFICANT CHANGE UP (ref 39–50)
HGB BLD-MCNC: 12.8 G/DL — LOW (ref 13–17)
INR BLD: 1.59 RATIO — HIGH (ref 0.88–1.16)
LYMPHOCYTES # BLD AUTO: 0.55 K/UL — LOW (ref 1–3.3)
LYMPHOCYTES # BLD AUTO: 8.7 % — LOW (ref 13–44)
MANUAL SMEAR VERIFICATION: SIGNIFICANT CHANGE UP
MCHC RBC-ENTMCNC: 30.9 PG — SIGNIFICANT CHANGE UP (ref 27–34)
MCHC RBC-ENTMCNC: 31.1 GM/DL — LOW (ref 32–36)
MCV RBC AUTO: 99.3 FL — SIGNIFICANT CHANGE UP (ref 80–100)
MONOCYTES # BLD AUTO: 0.22 K/UL — SIGNIFICANT CHANGE UP (ref 0–0.9)
MONOCYTES NFR BLD AUTO: 3.5 % — SIGNIFICANT CHANGE UP (ref 2–14)
NEUTROPHILS # BLD AUTO: 5.51 K/UL — SIGNIFICANT CHANGE UP (ref 1.8–7.4)
NEUTROPHILS NFR BLD AUTO: 87.8 % — HIGH (ref 43–77)
PLAT MORPH BLD: NORMAL — SIGNIFICANT CHANGE UP
PLATELET # BLD AUTO: 363 K/UL — SIGNIFICANT CHANGE UP (ref 150–400)
POLYCHROMASIA BLD QL SMEAR: SIGNIFICANT CHANGE UP
POTASSIUM SERPL-MCNC: 3.5 MMOL/L — SIGNIFICANT CHANGE UP (ref 3.5–5.3)
POTASSIUM SERPL-SCNC: 3.5 MMOL/L — SIGNIFICANT CHANGE UP (ref 3.5–5.3)
PROT SERPL-MCNC: 7 G/DL — SIGNIFICANT CHANGE UP (ref 6.6–8.7)
PROTHROM AB SERPL-ACNC: 18 SEC — HIGH (ref 10.6–13.6)
RBC # BLD: 4.14 M/UL — LOW (ref 4.2–5.8)
RBC # FLD: 13.2 % — SIGNIFICANT CHANGE UP (ref 10.3–14.5)
RBC BLD AUTO: ABNORMAL
SARS-COV-2 RNA SPEC QL NAA+PROBE: SIGNIFICANT CHANGE UP
SODIUM SERPL-SCNC: 139 MMOL/L — SIGNIFICANT CHANGE UP (ref 135–145)
WBC # BLD: 6.27 K/UL — SIGNIFICANT CHANGE UP (ref 3.8–10.5)
WBC # FLD AUTO: 6.27 K/UL — SIGNIFICANT CHANGE UP (ref 3.8–10.5)

## 2021-03-18 PROCEDURE — 99285 EMERGENCY DEPT VISIT HI MDM: CPT | Mod: CS

## 2021-03-18 PROCEDURE — 44180 LAP ENTEROLYSIS: CPT

## 2021-03-18 PROCEDURE — 99024 POSTOP FOLLOW-UP VISIT: CPT

## 2021-03-18 PROCEDURE — 93010 ELECTROCARDIOGRAM REPORT: CPT

## 2021-03-18 RX ORDER — BUPIVACAINE 13.3 MG/ML
20 INJECTION, SUSPENSION, LIPOSOMAL INFILTRATION ONCE
Refills: 0 | Status: DISCONTINUED | OUTPATIENT
Start: 2021-03-18 | End: 2021-03-19

## 2021-03-18 RX ORDER — SODIUM CHLORIDE 9 MG/ML
1000 INJECTION INTRAMUSCULAR; INTRAVENOUS; SUBCUTANEOUS
Refills: 0 | Status: DISCONTINUED | OUTPATIENT
Start: 2021-03-18 | End: 2021-03-19

## 2021-03-18 RX ORDER — CYCLOBENZAPRINE HYDROCHLORIDE 10 MG/1
5 TABLET, FILM COATED ORAL DAILY
Refills: 0 | Status: DISCONTINUED | OUTPATIENT
Start: 2021-03-18 | End: 2021-03-19

## 2021-03-18 RX ORDER — GABAPENTIN 400 MG/1
300 CAPSULE ORAL THREE TIMES A DAY
Refills: 0 | Status: DISCONTINUED | OUTPATIENT
Start: 2021-03-18 | End: 2021-03-19

## 2021-03-18 RX ORDER — SODIUM CHLORIDE 9 MG/ML
1000 INJECTION, SOLUTION INTRAVENOUS
Refills: 0 | Status: DISCONTINUED | OUTPATIENT
Start: 2021-03-18 | End: 2021-03-18

## 2021-03-18 RX ORDER — ENOXAPARIN SODIUM 100 MG/ML
40 INJECTION SUBCUTANEOUS EVERY 24 HOURS
Refills: 0 | Status: DISCONTINUED | OUTPATIENT
Start: 2021-03-18 | End: 2021-03-19

## 2021-03-18 RX ORDER — TAMSULOSIN HYDROCHLORIDE 0.4 MG/1
0.4 CAPSULE ORAL AT BEDTIME
Refills: 0 | Status: DISCONTINUED | OUTPATIENT
Start: 2021-03-18 | End: 2021-03-19

## 2021-03-18 RX ORDER — FENTANYL CITRATE 50 UG/ML
50 INJECTION INTRAVENOUS
Refills: 0 | Status: DISCONTINUED | OUTPATIENT
Start: 2021-03-18 | End: 2021-03-18

## 2021-03-18 RX ORDER — ACETAMINOPHEN 500 MG
975 TABLET ORAL EVERY 8 HOURS
Refills: 0 | Status: DISCONTINUED | OUTPATIENT
Start: 2021-03-18 | End: 2021-03-19

## 2021-03-18 RX ORDER — ONDANSETRON 8 MG/1
4 TABLET, FILM COATED ORAL EVERY 6 HOURS
Refills: 0 | Status: DISCONTINUED | OUTPATIENT
Start: 2021-03-18 | End: 2021-03-19

## 2021-03-18 RX ORDER — PANTOPRAZOLE SODIUM 20 MG/1
40 TABLET, DELAYED RELEASE ORAL
Refills: 0 | Status: DISCONTINUED | OUTPATIENT
Start: 2021-03-18 | End: 2021-03-19

## 2021-03-18 RX ADMIN — TAMSULOSIN HYDROCHLORIDE 0.4 MILLIGRAM(S): 0.4 CAPSULE ORAL at 21:11

## 2021-03-18 RX ADMIN — Medication 975 MILLIGRAM(S): at 21:10

## 2021-03-18 RX ADMIN — ENOXAPARIN SODIUM 40 MILLIGRAM(S): 100 INJECTION SUBCUTANEOUS at 21:11

## 2021-03-18 RX ADMIN — GABAPENTIN 300 MILLIGRAM(S): 400 CAPSULE ORAL at 21:10

## 2021-03-18 RX ADMIN — Medication 975 MILLIGRAM(S): at 21:40

## 2021-03-18 RX ADMIN — ONDANSETRON 4 MILLIGRAM(S): 8 TABLET, FILM COATED ORAL at 20:02

## 2021-03-18 RX ADMIN — SODIUM CHLORIDE 72 MILLILITER(S): 9 INJECTION INTRAMUSCULAR; INTRAVENOUS; SUBCUTANEOUS at 20:06

## 2021-03-18 NOTE — ED PROVIDER NOTE - CLINICAL SUMMARY MEDICAL DECISION MAKING FREE TEXT BOX
71 y/o M presents with 3 days vomiting and epigastric pain s/p 1 month hiatal hernia repair by Dr. Yeh at [facility].   VSS,  Epigastric tenderness, uncomfortable appearing  Surgery resident for Dr. Yeh aware and at bedside  TBA   Preop labs, EKG, CXR

## 2021-03-18 NOTE — ED CLERICAL - CLERICAL COMMENTS
dr downing/dr suarez [phone] sending Willis Hamilton - parasophageal hernia repair 2/17/21- pre op, dr suarez coming to see him

## 2021-03-18 NOTE — H&P ADULT - NSICDXPASTSURGICALHX_GEN_ALL_CORE_FT
PAST SURGICAL HISTORY:  H/O knee surgery x3    History of facial surgery     History of tonsillectomy as a child

## 2021-03-18 NOTE — BRIEF OPERATIVE NOTE - OPERATION/FINDINGS
entered via veress needle in LUQ.   4 ports placed  stomach in abdomen, no evidence of recurrent hiatal hernia  one band which was occluding the distal stomach that was lysed  ogt placed under direct visualization and suctioned gastric contents   ogt removed at end of case

## 2021-03-18 NOTE — ED PROVIDER NOTE - PMH
Afib    Allergic bronchitis    At risk for sleep apnea  Bang score 4  Chronic diarrhea    History of gastroesophageal reflux (GERD)    Hypothyroidism  Resolved at 12 years old  Osteoarthritis    Prostate CA  Stage 4  Rheumatoid arthritis

## 2021-03-18 NOTE — REASON FOR VISIT
[Post Op: _________] : a [unfilled] post op visit [Family Member] : family member [FreeTextEntry1] : PEH repair

## 2021-03-18 NOTE — H&P ADULT - NSHPPHYSICALEXAM_GEN_ALL_CORE
PHYSICAL EXAM:  GENERAL: NAD, well-developed, uncomfortable  HEAD:  Atraumatic, Normocephalic  EYES: EOMI, conjunctiva and sclera clear  CHEST/LUNG: Unlabored, no conversational dyspnea  HEART: Regular rate and rhythm;   ABDOMEN: Soft, Nondistended; No mass palpated. Epigastric discomfort. Mild LLQ pain. No rebound tenderness or guarding. No overlying skin changes.   PSYCH: AAOx3  NEUROLOGY: non-focal  SKIN: No rashes or lesions

## 2021-03-18 NOTE — H&P ADULT - ASSESSMENT
71 y/o male w/ PMH significant for A.fib on eliquis, Prostate CA, GERD, RA, and hiatal hernia s/p robot assisted repair with gastropexy on 2/17/2021 with Dr. Carty and Dr. Boucher presents with 3 days of nausea and vomiting with abdominal discomfort, found on outside imaging with recurrence of hiatal hernia. Hemodynamically stable with no other medical changes since last admission. Plan to go to OR for repair.     # Recurrent Hiatal Hernia  - Admit to bariatric surgery under Dr. Carty  - To go to OR for laparoscopic repair of recurrent hiatal hernia  - NPO / IVF  - Pain control and nausea control PRN  - Pre-op labs  - EKG and CXR      Plan discussed with Dr. Carty.    71 y/o male w/ PMH significant for A.fib on eliquis, Prostate CA, GERD, RA, and hiatal hernia s/p robot assisted repair with gastropexy on 2/17/2021 with Dr. Carty and Dr. Boucher presents with 3 days of nausea and vomiting with abdominal discomfort, found on outside imaging with concern for recurrence of hiatal hernia. Hemodynamically stable with no other medical changes since last admission. Plan to go to OR for repair.     # Recurrent Hiatal Hernia  - Admit to bariatric surgery under Dr. Carty  - To go to OR for laparoscopic repair of recurrent hiatal hernia  - NPO / IVF  - Pain control and nausea control PRN  - Pre-op labs  - EKG and CXR      Plan discussed with Dr. Carty.

## 2021-03-18 NOTE — DISCHARGE NOTE PROVIDER - NSDCFUSCHEDAPPT_GEN_ALL_CORE_FT
JAVIER CARBAJAL ; 04/01/2021 ; [facility] [place] [address] [facility]

## 2021-03-18 NOTE — HISTORY OF PRESENT ILLNESS
[de-identified] : Mr. JAVIER CARBAJAL is a 70 year-old man s/p robot-assisted laparoscopic reduction and repair of type 4 paraesophageal hernia with mesh, gastropexy and anterior fundoplication on 2/17/2021 who comes in today for a post op visit. Patient reports nausea, + retching noted.  This is new since last visit.\par Patient has been seen at an outside hospital twice the second time was yesterday where a recurrent hiatal hernia was identified (by report) patient notes his symptoms are completely resolved with Zofran however he cannot pinpoint when they started.\par Patient's son verified the above and had very little additional information to add.

## 2021-03-18 NOTE — CHART NOTE - NSCHARTNOTEFT_GEN_A_CORE
HPI/Interval Events: Patient s/p Diagnostic laparoscopy and lysis of adhesions causing obstruction of distal stomach. No acute intervening events. Patient notes feeling better post-operatively, although he does note some abdominal soreness. Reeves removed post-operatively; passed trial of void. Tolerating small amounts of full liquid diet. Has not yet passed flatus or had BM. Denies fevers, chills, nausea or vomiting, chest pain, or shortness of breath.    MEDICATIONS  (STANDING):  acetaminophen   Tablet .. 975 milliGRAM(s) Oral every 8 hours  BUpivacaine liposome 1.3% Injectable 20 milliLiter(s) Local Injection once  calcium carbonate 1250 mG  + Vitamin D (OsCal 500 + D) 1 Tablet(s) Oral two times a day  enoxaparin Injectable 40 milliGRAM(s) SubCutaneous every 24 hours  gabapentin 300 milliGRAM(s) Oral three times a day  pantoprazole    Tablet 40 milliGRAM(s) Oral before breakfast  sodium chloride 0.9%. 1000 milliLiter(s) (72 mL/Hr) IV Continuous <Continuous>  tamsulosin 0.4 milliGRAM(s) Oral at bedtime    MEDICATIONS  (PRN):  cyclobenzaprine 5 milliGRAM(s) Oral daily PRN Muscle Spasm  ondansetron Injectable 4 milliGRAM(s) IV Push every 6 hours PRN Nausea      Vital Signs Last 24 Hrs  T(C): 36.6 (18 Mar 2021 19:07), Max: 38.2 (18 Mar 2021 13:53)  T(F): 97.9 (18 Mar 2021 19:07), Max: 100.8 (18 Mar 2021 13:53)  HR: 78 (18 Mar 2021 19:07) (72 - 94)  BP: 152/84 (18 Mar 2021 19:07) (110/81 - 152/84)  BP(mean): --  RR: 18 (18 Mar 2021 19:07) (15 - 23)  SpO2: 97% (18 Mar 2021 19:07) (97% - 100%)    Constitutional: patient resting comfortably in bed, in no acute distress  HEENT: EOMI / PERRL b/l, no active drainage or redness  Neck: No JVD, full ROM without pain  Respiratory: respirations are unlabored, no accessory muscle use, no conversational dyspnea  Cardiovascular: regular rate & rhythm  Gastrointestinal: Laparoscopic abdominal incisions with overlying Dermabond, c/d/i. Abdomen soft, nondistended, tender to palpation around incision sites without rebound or guarding  Neurological: GCS: 15 (4/5/6). A&O x 3; no gross sensory / motor / coordination deficits  Psychiatric: Normal mood, normal affect  Musculoskeletal: No joint pain, swelling or deformity; no limitation of movement      I&O's Detail    18 Mar 2021 07:01  -  18 Mar 2021 19:38  --------------------------------------------------------  IN:    Lactated Ringers: 225 mL  Total IN: 225 mL    OUT:    Indwelling Catheter - Urethral (mL): 400 mL  Total OUT: 400 mL    Total NET: -175 mL          LABS:                        12.8   6.27  )-----------( 363      ( 18 Mar 2021 12:10 )             41.1     03-18    139  |  100  |  12.0  ----------------------------<  112<H>  3.5   |  24.0  |  0.75    Ca    9.0      18 Mar 2021 12:10    TPro  7.0  /  Alb  4.2  /  TBili  0.6  /  DBili  x   /  AST  30  /  ALT  14  /  AlkPhos  85  03-18    PT/INR - ( 18 Mar 2021 12:10 )   PT: 18.0 sec;   INR: 1.59 ratio         PTT - ( 18 Mar 2021 12:10 )  PTT:38.2 sec    A/P: 69 y/o male w/PMH significant for A.fib on Eliquis, Prostate Ca, GERD, RA, and hiatal hernia s/p robot assisted repair with gastropexy on 2/17/2021, now s/p diagnostic laparoscopy with lysis of adhesions for treatment of gastric outlet obstruction. Doing well post-operatively.    -Continue full liquid diet  -Lovenox PPx; will restart Eliquis in AM  -Encourage IS use  -Encourage OOB as tolerated  -F/U AM Labs

## 2021-03-18 NOTE — H&P ADULT - ATTENDING COMMENTS
Clinical documentation and note reviewed and edited where appropriate.  D/W note writer at time of visit to office.    Pt to go to OR with Dr Boucher.    Risks, benefits and alternatives discussed with pt and son at length.  The patient appears to understand and wishes to proceed.  All questions answered.

## 2021-03-18 NOTE — REVIEW OF SYSTEMS
[Fever] : no fever [Chills] : no chills [Feeling Poorly] : feeling poorly [Feeling Tired] : feeling tired [As Noted in HPI] : as noted in HPI [Negative] : Psychiatric

## 2021-03-18 NOTE — DISCHARGE NOTE PROVIDER - HOSPITAL COURSE
HPI  71 y/o male w/ PMH significant for A.fib on eliquis, Prostate CA, GERD, RA, and hiatal hernia s/p robot assisted repair with gastropexy on 2/17/2021 with Dr. Carty and Dr. Boucher. Patient presents with 3 days of nausea and vomiting. Had been doin well post-operatively and tolerating a soft diet prior, but has only been having liquids the past 3 days due to this nausea and vomiting. Emesis non-bloody, non-bilious. Also reports abdominal epigastric discomfort. Denies fevers, chills, chest pain or SOB. Passing flatus and last BM was yesterday morning. Had outside CT performed which showed reccurence of the hiatal hernia. Reports no other changes since discharge.    Patient was taken immediately to the OR on 3/18 for a diagnostic laparoscopy and was found to have no recurrence of hiatal hernia, but he did have a gastric outlet obstruction secondary to an adhesion that was around the distal stomach. Post operatively, he did well, tolerating a FLD, voiding, and pain well controlled. HPI  69 y/o male w/ PMH significant for A.fib on eliquis, Prostate CA, GERD, RA, and hiatal hernia s/p robot assisted repair with gastropexy on 2/17/2021 with Dr. Carty and Dr. Boucher. Patient presents with 3 days of nausea and vomiting. Had been doin well post-operatively and tolerating a soft diet prior, but has only been having liquids the past 3 days due to this nausea and vomiting. Emesis non-bloody, non-bilious. Also reports abdominal epigastric discomfort. Denies fevers, chills, chest pain or SOB. Passing flatus and last BM was yesterday morning. Had outside CT performed which showed reccurence of the hiatal hernia. Reports no other changes since discharge.    Patient was taken immediately to the OR on 3/18 for a diagnostic laparoscopy and was found to have no recurrence of hiatal hernia, but he did have a gastric outlet obstruction secondary to an adhesion that was around the distal stomach. Post operatively, he did well, tolerating a soft diet, voiding, and pain well controlled.

## 2021-03-18 NOTE — H&P ADULT - HISTORY OF PRESENT ILLNESS
71 y/o male w/ PMH significant for A.fib on eliquis, Prostate CA, GERD, RA, and hiatal hernia s/p robot assisted repair with gastropexy on 2/17/2021 with Dr. Carty and Dr. Boucher. Patient presents with 3 days of nausea and vomiting. Had been doin well post-operatively and tolerating a soft diet prior, but has only been having liquids the past 3 days due to this nausea and vomiting. Emesis non-bloody, non-bilious. Also reports abdominal epigastric discomfort. Denies fevers, chills, chest pain or SOB. Passing flatus and last BM was yesterday morning. Had outside CT performed which showed reccurence of the hiatal hernia. Reports no other changes since discharge. 71 y/o male w/ PMH significant for A.fib on eliquis, Prostate CA, GERD, RA, and hiatal hernia s/p robot assisted repair with gastropexy on 2/17/2021 with Dr. Carty and Dr. Boucher. Patient presents with 3 days of nausea and vomiting. Had been doin well post-operatively and tolerating a soft diet prior, but has only been having liquids the past 3 days due to this nausea and vomiting. Emesis non-bloody, non-bilious. Also reports abdominal epigastric discomfort. Denies fevers, chills, chest pain or SOB. Passing flatus and last BM was yesterday morning. Had outside CT performed which showed concern for recurrence of the hiatal hernia. Reports no other changes since discharge.

## 2021-03-18 NOTE — ED ADULT NURSE NOTE - OBJECTIVE STATEMENT
Pt had hiatal hernia repair 1 month ago and was told to come in for revision due to intense 3 days of no appetite, nausea, vomiting, and pain. Pt calm and cooperative during RN assessment. Pt prepped for OR.

## 2021-03-18 NOTE — ED PROVIDER NOTE - OBJECTIVE STATEMENT
69 y/o M pt with significant PMHx of Afib, GERD, stage 4 prostate cancer, and RA presents to the ED c/o nausea, vomiting and decreased appetite that began 3 days ago. Pt notes he had a hiatal hernia that was operated on Feb 17th. He notes multiple episodes of nbnb emesis on each of the last 3 days. Pt denies fevers/chills, ha, loc, focal neuro deficits, cp/sob/palp, cough, abd pain urinary symptoms, recent travel and sick contacts.

## 2021-03-18 NOTE — ASSESSMENT
[FreeTextEntry1] : Nausea and retching 1 month status post complex type IV paraesophageal hernia repair with mesh rule out recurrence.  Patient reportedly had 2 visits to an outside hospital that showed recurrence of the hernia.  The disc is not available at this time for viewing.

## 2021-03-18 NOTE — DISCHARGE NOTE PROVIDER - NSDCMRMEDTOKEN_GEN_ALL_CORE_FT
acetaminophen 160 mg/5 mL oral liquid: 30 milliliter(s) orally once a day   Caltrate 600 + D oral tablet: 1 tab(s) orally 2 times a day  Claritin 10 mg oral tablet: 1 tab(s) orally once a day, As Needed  Eliquis 5 mg oral tablet: 1 tab(s) orally 2 times a day  Flexeril 5 mg oral tablet: orally once a day  folic acid 1 mg oral tablet: 1 tab(s) orally once a day  gabapentin 300 mg oral tablet: orally 2 times a day  Imodium 2 mg oral capsule: 1 cap(s) orally every 4 hours, As Needed  Levsin SL 0.125 mg sublingual tablet: 1 tab(s) sublingually every 6 hours, As Needed  gastric spasm  Lomotil 2.5 mg-0.025 mg oral tablet:   methotrexate: 8 milligram(s) orally once a day  hold for at least two weeks post op   omeprazole 40 mg oral delayed release capsule: 1 opened cap(s) orally once a day   ondansetron 4 mg oral tablet, disintegratin tab(s) orally every 6 hours   oxyCODONE 5 mg oral tablet: 1 tab(s) orally every 6 hours, As Needed  Prolia: subcutaneous every 3 months  will follow up with outpatient MD, must wait at least two weeks after surgery for next injection   tamsulosin 0.4 mg oral capsule: orally 2 times a day  Vitamin B12: orally once a day  Vitamin D3 2000 intl units (50 mcg) oral tablet: orally once a day   acetaminophen 160 mg/5 mL oral liquid: 30 milliliter(s) orally once a day   Caltrate 600 + D oral tablet: 1 tab(s) orally 2 times a day  Claritin 10 mg oral tablet: 1 tab(s) orally once a day, As Needed  Eliquis 5 mg oral tablet: 1 tab(s) orally 2 times a day  Flexeril 5 mg oral tablet: orally once a day  folic acid 1 mg oral tablet: 1 tab(s) orally once a day  gabapentin 300 mg oral tablet: orally 2 times a day  Imodium 2 mg oral capsule: 1 cap(s) orally every 4 hours, As Needed  Levsin SL 0.125 mg sublingual tablet: 1 tab(s) sublingually every 6 hours, As Needed  gastric spasm  Lomotil 2.5 mg-0.025 mg oral tablet:   methotrexate: 8 milligram(s) orally once a day  hold for at least two weeks post op   ondansetron 4 mg oral tablet, disintegratin tab(s) orally every 6 hours   oxyCODONE 5 mg oral tablet: 1 tab(s) orally every 6 hours, As Needed  Prolia: subcutaneous every 3 months  will follow up with outpatient MD, must wait at least two weeks after surgery for next injection   tamsulosin 0.4 mg oral capsule: orally 2 times a day  Vitamin B12: orally once a day  Vitamin D3 2000 intl units (50 mcg) oral tablet: orally once a day

## 2021-03-18 NOTE — DISCHARGE NOTE PROVIDER - NSDCCPCAREPLAN_GEN_ALL_CORE_FT
PRINCIPAL DISCHARGE DIAGNOSIS  Diagnosis: Nausea and vomiting  Assessment and Plan of Treatment: Follow up: Please call and make an appointment with Dr. Carty 10-14 days after discharge. Also, please call and make an appointment with your primary care physician as per your usual schedule.   Activity: May return to normal activities as tolerated.  Diet: You may continue ***  Medications: Please take all home medications as prescribed by your primary care doctor. Pain medication has been prescribed for you. Please, take it as it has been prescribed, do not drive or operate heavy machinery while taking narcotics.  You are encouraged to take over-the-counter tylenol and/or ibuprofen for pain relief when you feel your pain no longer warrants the use of narcotic pain medications.  Wound Care: Please, keep wound site clean and dry. You may shower, but do not bathe.  Patient is advised to RETURN TO THE EMERGENCY DEPARTMENT for any of the following - worsening pain, fever/chills, nausea/vomiting, altered mental status, chest pain, shortness of breath, or any other new / worsening symptom.        SECONDARY DISCHARGE DIAGNOSES  Diagnosis: Intractable vomiting with nausea  Assessment and Plan of Treatment:      PRINCIPAL DISCHARGE DIAGNOSIS  Diagnosis: Nausea and vomiting  Assessment and Plan of Treatment: Follow up: Please call and make an appointment with Dr. Carty 10-14 days after discharge. Also, please call and make an appointment with your primary care physician as per your usual schedule.   Activity: May return to normal activities as tolerated.  Diet: You may continue full liquid diet  Medications: Please take all home medications as prescribed by your primary care doctor. Pain medication has been prescribed for you. Please, take it as it has been prescribed, do not drive or operate heavy machinery while taking narcotics.  You are encouraged to take over-the-counter tylenol and/or ibuprofen for pain relief when you feel your pain no longer warrants the use of narcotic pain medications.  Wound Care: Please, keep wound site clean and dry. You may shower, but do not bathe.  Patient is advised to RETURN TO THE EMERGENCY DEPARTMENT for any of the following - worsening pain, fever/chills, nausea/vomiting, altered mental status, chest pain, shortness of breath, or any other new / worsening symptom.        SECONDARY DISCHARGE DIAGNOSES  Diagnosis: Intractable vomiting with nausea  Assessment and Plan of Treatment:      PRINCIPAL DISCHARGE DIAGNOSIS  Diagnosis: Nausea and vomiting  Assessment and Plan of Treatment: Follow up: Please call and make an appointment with Dr. Carty 10-14 days after discharge. Also, please call and make an appointment with your primary care physician as per your usual schedule.   Activity: May return to normal activities as tolerated.  Diet: You may continue soft diet  Medications: Please take all home medications as prescribed by your primary care doctor. Pain medication has been prescribed for you. Please, take it as it has been prescribed, do not drive or operate heavy machinery while taking narcotics.  You are encouraged to take over-the-counter tylenol and/or ibuprofen for pain relief when you feel your pain no longer warrants the use of narcotic pain medications.  Wound Care: Please, keep wound site clean and dry. You may shower, but do not bathe.  Patient is advised to RETURN TO THE EMERGENCY DEPARTMENT for any of the following - worsening pain, fever/chills, nausea/vomiting, altered mental status, chest pain, shortness of breath, or any other new / worsening symptom.        SECONDARY DISCHARGE DIAGNOSES  Diagnosis: Intractable vomiting with nausea  Assessment and Plan of Treatment:

## 2021-03-18 NOTE — BRIEF OPERATIVE NOTE - NSICDXBRIEFPROCEDURE_GEN_ALL_CORE_FT
PROCEDURES:  Laparoscopic lysis of abdominal adhesions 18-Mar-2021 15:43:04  Erin Feng  Diagnostic laparoscopy 18-Mar-2021 15:42:37  Erin Feng

## 2021-03-18 NOTE — H&P ADULT - NSHPLABSRESULTS_GEN_ALL_CORE
Vital Signs Last 24 Hrs  T(C): 36.4 (18 Mar 2021 11:07), Max: 36.4 (18 Mar 2021 11:07)  T(F): 97.5 (18 Mar 2021 11:07), Max: 97.5 (18 Mar 2021 11:07)  HR: 94 (18 Mar 2021 11:07) (94 - 94)  BP: 122/84 (18 Mar 2021 11:07) (122/84 - 122/84)  BP(mean): --  RR: 18 (18 Mar 2021 11:07) (18 - 18)  SpO2: 98% (18 Mar 2021 11:07) (98% - 98%)        LABS:                        12.8   6.27  )-----------( 363      ( 18 Mar 2021 12:10 )             41.1     03-18    139  |  100  |  12.0  ----------------------------<  112<H>  3.5   |  24.0  |  0.75    Ca    9.0      18 Mar 2021 12:10    TPro  7.0  /  Alb  4.2  /  TBili  0.6  /  DBili  x   /  AST  30  /  ALT  14  /  AlkPhos  85  03-18    PT/INR - ( 18 Mar 2021 12:10 )   PT: 18.0 sec;   INR: 1.59 ratio         PTT - ( 18 Mar 2021 12:10 )  PTT:38.2 sec

## 2021-03-18 NOTE — ED PROVIDER NOTE - PHYSICAL EXAMINATION
Constitutional: Awake, Alert. NAD. Well appearing, well nourished. Cooperative. VSS.  HEAD: NC/AT; no signs of trauma   NECK: Supple, non-tender. No nuchal rigidity. FROM. No midline or paraspinal TTP.  CARDIOVASCULAR: Normal S1, S2; RRR. No audible murmurs. No chest wall ttp. Radial pulses +2 B/L.  RESPIRATORY: Speaking full sentences. Normal respiratory effort; breath sounds CTAB, No WRR. No accessory muscle use.   ABDOMEN: Soft; NTND. No CVAT B/L. +BS x4 quadrants.  EXTREMITIES: Full passive and active ROM in all extremities; non-tender to palpation; distal pulses palpable and symmetric, no edema, no crepitus or step off.  SKIN: Warm, dry; good skin turgor, no apparent lesions or rashes, no ecchymosis, brisk capillary refill.  NEURO: AAOx3 follows commands. No facial droop. CN 2-12 grossly intact. No truncal ataxia. Steady gait. Muscle strength 5/5 UE and LE B/L.

## 2021-03-18 NOTE — ED ADULT TRIAGE NOTE - CHIEF COMPLAINT QUOTE
pt brought in via wheelchair, a&ox3, no acute distress, breaths even and unlabored c/o decreased appetite, nausea and vomiting x 4 days. reports recent hiatal hernia repair 1 month ago. denies fever.

## 2021-03-18 NOTE — PLAN
[FreeTextEntry1] : Patient to go to the ED for IV hydration and antinausea medication.\par Son to retrieve CD so CT does not have to be repeated\par Dr. Boucher will see patient in ED if admission or reoperation is required.\par may need laparoscopic repair of hiatal hernia with gastric plication. \par \par \par \par We discussed risks and benefits of the procedure, including recurrence rate of 10-20%, gas bloat, dysphagia, bleeding, infection, gastroesophageal reflux and damage to surrounding organs and structures. We discussed the possibility of mesh placement/removal depending on the conditions noted. We discussed the postoperative liquid diet. The patient expressed understanding of the procedure, its risks, and its limitations. All questions were answered and the patient agrees to proceed with plan as outlined above\par \par

## 2021-03-18 NOTE — PHYSICAL EXAM
[Alert] : alert [Oriented to Person] : oriented to person [Oriented to Place] : oriented to place [Oriented to Time] : oriented to time [Calm] : calm [de-identified] : Ill-appearing man appearing stated age [de-identified] : Anicteric, mucous membranes moist [de-identified] : Soft nontender no hernias [de-identified] : Understood consultation/visit assessment

## 2021-03-18 NOTE — DISCHARGE NOTE PROVIDER - CARE PROVIDER_API CALL
Darion Carty)  Surgery  [address] [place] [address] [id number]  Phone: [phone]  Fax: [phone]  Follow Up Time: 2 weeks

## 2021-03-18 NOTE — H&P ADULT - NSICDXPASTMEDICALHX_GEN_ALL_CORE_FT
PAST MEDICAL HISTORY:  Afib     Allergic bronchitis     At risk for sleep apnea Bang score 4    Chronic diarrhea     History of gastroesophageal reflux (GERD)     Hypothyroidism Resolved at 12 years old    Osteoarthritis     Prostate CA Stage 4    Rheumatoid arthritis

## 2021-03-19 ENCOUNTER — TRANSCRIPTION ENCOUNTER (OUTPATIENT)
Age: 71
End: 2021-03-19

## 2021-03-19 VITALS
TEMPERATURE: 97 F | RESPIRATION RATE: 16 BRPM | OXYGEN SATURATION: 93 % | SYSTOLIC BLOOD PRESSURE: 149 MMHG | DIASTOLIC BLOOD PRESSURE: 96 MMHG | HEART RATE: 71 BPM

## 2021-03-19 LAB
ANION GAP SERPL CALC-SCNC: 15 MMOL/L — SIGNIFICANT CHANGE UP (ref 5–17)
BASOPHILS # BLD AUTO: 0 K/UL — SIGNIFICANT CHANGE UP (ref 0–0.2)
BASOPHILS NFR BLD AUTO: 0 % — SIGNIFICANT CHANGE UP (ref 0–2)
BUN SERPL-MCNC: 8 MG/DL — SIGNIFICANT CHANGE UP (ref 8–20)
CALCIUM SERPL-MCNC: 8 MG/DL — LOW (ref 8.6–10.2)
CHLORIDE SERPL-SCNC: 102 MMOL/L — SIGNIFICANT CHANGE UP (ref 98–107)
CO2 SERPL-SCNC: 20 MMOL/L — LOW (ref 22–29)
COVID-19 SPIKE DOMAIN AB INTERP: NEGATIVE — SIGNIFICANT CHANGE UP
COVID-19 SPIKE DOMAIN ANTIBODY RESULT: 0.4 U/ML — SIGNIFICANT CHANGE UP
CREAT SERPL-MCNC: 0.62 MG/DL — SIGNIFICANT CHANGE UP (ref 0.5–1.3)
EOSINOPHIL # BLD AUTO: 0 K/UL — SIGNIFICANT CHANGE UP (ref 0–0.5)
EOSINOPHIL NFR BLD AUTO: 0 % — SIGNIFICANT CHANGE UP (ref 0–6)
GLUCOSE SERPL-MCNC: 120 MG/DL — HIGH (ref 70–99)
HCT VFR BLD CALC: 35.5 % — LOW (ref 39–50)
HCV AB S/CO SERPL IA: 0.08 S/CO — SIGNIFICANT CHANGE UP (ref 0–0.99)
HCV AB SERPL-IMP: SIGNIFICANT CHANGE UP
HGB BLD-MCNC: 11.6 G/DL — LOW (ref 13–17)
IMM GRANULOCYTES NFR BLD AUTO: 0.4 % — SIGNIFICANT CHANGE UP (ref 0–1.5)
LYMPHOCYTES # BLD AUTO: 0.44 K/UL — LOW (ref 1–3.3)
LYMPHOCYTES # BLD AUTO: 7.7 % — LOW (ref 13–44)
MAGNESIUM SERPL-MCNC: 2.1 MG/DL — SIGNIFICANT CHANGE UP (ref 1.8–2.6)
MANUAL SMEAR VERIFICATION: SIGNIFICANT CHANGE UP
MCHC RBC-ENTMCNC: 32.1 PG — SIGNIFICANT CHANGE UP (ref 27–34)
MCHC RBC-ENTMCNC: 32.7 GM/DL — SIGNIFICANT CHANGE UP (ref 32–36)
MCV RBC AUTO: 98.3 FL — SIGNIFICANT CHANGE UP (ref 80–100)
MONOCYTES # BLD AUTO: 0.34 K/UL — SIGNIFICANT CHANGE UP (ref 0–0.9)
MONOCYTES NFR BLD AUTO: 6 % — SIGNIFICANT CHANGE UP (ref 2–14)
NEUTROPHILS # BLD AUTO: 4.88 K/UL — SIGNIFICANT CHANGE UP (ref 1.8–7.4)
NEUTROPHILS NFR BLD AUTO: 85.9 % — HIGH (ref 43–77)
PHOSPHATE SERPL-MCNC: 1.7 MG/DL — LOW (ref 2.4–4.7)
PLAT MORPH BLD: NORMAL — SIGNIFICANT CHANGE UP
PLATELET # BLD AUTO: 275 K/UL — SIGNIFICANT CHANGE UP (ref 150–400)
POTASSIUM SERPL-MCNC: 3.3 MMOL/L — LOW (ref 3.5–5.3)
POTASSIUM SERPL-SCNC: 3.3 MMOL/L — LOW (ref 3.5–5.3)
RBC # BLD: 3.61 M/UL — LOW (ref 4.2–5.8)
RBC # FLD: 13.1 % — SIGNIFICANT CHANGE UP (ref 10.3–14.5)
RBC BLD AUTO: NORMAL — SIGNIFICANT CHANGE UP
SARS-COV-2 IGG+IGM SERPL QL IA: 0.4 U/ML — SIGNIFICANT CHANGE UP
SARS-COV-2 IGG+IGM SERPL QL IA: NEGATIVE — SIGNIFICANT CHANGE UP
SODIUM SERPL-SCNC: 137 MMOL/L — SIGNIFICANT CHANGE UP (ref 135–145)
WBC # BLD: 5.68 K/UL — SIGNIFICANT CHANGE UP (ref 3.8–10.5)
WBC # FLD AUTO: 5.68 K/UL — SIGNIFICANT CHANGE UP (ref 3.8–10.5)

## 2021-03-19 PROCEDURE — 99285 EMERGENCY DEPT VISIT HI MDM: CPT

## 2021-03-19 PROCEDURE — 86850 RBC ANTIBODY SCREEN: CPT

## 2021-03-19 PROCEDURE — 86769 SARS-COV-2 COVID-19 ANTIBODY: CPT

## 2021-03-19 PROCEDURE — 85025 COMPLETE CBC W/AUTO DIFF WBC: CPT

## 2021-03-19 PROCEDURE — 83735 ASSAY OF MAGNESIUM: CPT

## 2021-03-19 PROCEDURE — 80053 COMPREHEN METABOLIC PANEL: CPT

## 2021-03-19 PROCEDURE — 36000 PLACE NEEDLE IN VEIN: CPT

## 2021-03-19 PROCEDURE — 86900 BLOOD TYPING SEROLOGIC ABO: CPT

## 2021-03-19 PROCEDURE — 86803 HEPATITIS C AB TEST: CPT

## 2021-03-19 PROCEDURE — 84100 ASSAY OF PHOSPHORUS: CPT

## 2021-03-19 PROCEDURE — 85730 THROMBOPLASTIN TIME PARTIAL: CPT

## 2021-03-19 PROCEDURE — 85610 PROTHROMBIN TIME: CPT

## 2021-03-19 PROCEDURE — U0005: CPT

## 2021-03-19 PROCEDURE — 93005 ELECTROCARDIOGRAM TRACING: CPT

## 2021-03-19 PROCEDURE — 80048 BASIC METABOLIC PNL TOTAL CA: CPT

## 2021-03-19 PROCEDURE — 86901 BLOOD TYPING SEROLOGIC RH(D): CPT

## 2021-03-19 PROCEDURE — U0003: CPT

## 2021-03-19 PROCEDURE — 36415 COLL VENOUS BLD VENIPUNCTURE: CPT

## 2021-03-19 RX ORDER — SODIUM,POTASSIUM PHOSPHATES 278-250MG
1 POWDER IN PACKET (EA) ORAL ONCE
Refills: 0 | Status: COMPLETED | OUTPATIENT
Start: 2021-03-19 | End: 2021-03-19

## 2021-03-19 RX ORDER — OXYCODONE HYDROCHLORIDE 5 MG/1
5 TABLET ORAL ONCE
Refills: 0 | Status: DISCONTINUED | OUTPATIENT
Start: 2021-03-19 | End: 2021-03-19

## 2021-03-19 RX ORDER — POTASSIUM PHOSPHATE, MONOBASIC POTASSIUM PHOSPHATE, DIBASIC 236; 224 MG/ML; MG/ML
30 INJECTION, SOLUTION INTRAVENOUS ONCE
Refills: 0 | Status: COMPLETED | OUTPATIENT
Start: 2021-03-19 | End: 2021-03-19

## 2021-03-19 RX ADMIN — Medication 975 MILLIGRAM(S): at 14:10

## 2021-03-19 RX ADMIN — SODIUM CHLORIDE 72 MILLILITER(S): 9 INJECTION INTRAMUSCULAR; INTRAVENOUS; SUBCUTANEOUS at 05:20

## 2021-03-19 RX ADMIN — Medication 975 MILLIGRAM(S): at 05:20

## 2021-03-19 RX ADMIN — ONDANSETRON 4 MILLIGRAM(S): 8 TABLET, FILM COATED ORAL at 03:55

## 2021-03-19 RX ADMIN — Medication 1 PACKET(S): at 10:15

## 2021-03-19 RX ADMIN — Medication 975 MILLIGRAM(S): at 13:32

## 2021-03-19 RX ADMIN — CYCLOBENZAPRINE HYDROCHLORIDE 5 MILLIGRAM(S): 10 TABLET, FILM COATED ORAL at 01:00

## 2021-03-19 RX ADMIN — Medication 1 TABLET(S): at 05:21

## 2021-03-19 RX ADMIN — PANTOPRAZOLE SODIUM 40 MILLIGRAM(S): 20 TABLET, DELAYED RELEASE ORAL at 05:23

## 2021-03-19 RX ADMIN — OXYCODONE HYDROCHLORIDE 5 MILLIGRAM(S): 5 TABLET ORAL at 02:00

## 2021-03-19 RX ADMIN — GABAPENTIN 300 MILLIGRAM(S): 400 CAPSULE ORAL at 13:32

## 2021-03-19 RX ADMIN — GABAPENTIN 300 MILLIGRAM(S): 400 CAPSULE ORAL at 05:20

## 2021-03-19 RX ADMIN — OXYCODONE HYDROCHLORIDE 5 MILLIGRAM(S): 5 TABLET ORAL at 01:27

## 2021-03-19 RX ADMIN — POTASSIUM PHOSPHATE, MONOBASIC POTASSIUM PHOSPHATE, DIBASIC 83.33 MILLIMOLE(S): 236; 224 INJECTION, SOLUTION INTRAVENOUS at 10:15

## 2021-03-19 RX ADMIN — Medication 975 MILLIGRAM(S): at 06:00

## 2021-03-19 NOTE — CHART NOTE - NSCHARTNOTEFT_GEN_A_CORE
Pt seen at bedside. Pain free. Waiting for his Son to pick him up.     Discharge today,     Bertrand Okeefe

## 2021-03-19 NOTE — CHART NOTE - NSCHARTNOTEFT_GEN_A_CORE
Pt seen at bedside. Showered, no complains. States same discomfort in the epigastric area. NO n/v/f/c.     PE: unchanged from this AM rounds, soft, non tender, non distended.     Discharge home today    Bertrand Okeefe MD

## 2021-03-19 NOTE — PROGRESS NOTE ADULT - ASSESSMENT
A/P: 71 y/o male w/PMH significant for A.fib on Eliquis, Prostate Ca, GERD, RA, and hiatal hernia s/p robot assisted repair with gastropexy on 2/17/2021, now POD1 s/p diagnostic laparoscopy with lysis of adhesions for treatment of gastric outlet obstruction. Stable post-operatively, albeit with some pain control issues.    -F/U AM Labs  -Can resume home Eliqius if hemoglobin stable  -Continue Full liquid diet  -Encourage OOB as tolerated  -Encourage IS use A/P: 69 y/o male w/PMH significant for A.fib on Eliquis, Prostate Ca, GERD, RA, and hiatal hernia s/p robot assisted repair with gastropexy on 2/17/2021, now POD1 s/p diagnostic laparoscopy with lysis of adhesions for treatment of gastric outlet obstruction. Stable post-operatively, albeit with some pain control issues.    -F/U AM Labs  -Can resume home Eliqius if hemoglobin stable  -Continue Full liquid diet  -Encourage OOB as tolerated  -Encourage IS use  -Confirm patient's outpatient pain regimen

## 2021-03-19 NOTE — DISCHARGE NOTE NURSING/CASE MANAGEMENT/SOCIAL WORK - PATIENT PORTAL LINK FT
You can access the FollowMyHealth Patient Portal offered by [facility] by registering at the following website: [email or web address]. By joining [employer]’s FollowMyHealth portal, you will also be able to view your health information using other applications (apps) compatible with our system.

## 2021-03-19 NOTE — PROGRESS NOTE ADULT - SUBJECTIVE AND OBJECTIVE BOX
HPI/OVERNIGHT EVENTS: NAEON. Patient s/p Diagnostic laparoscopy and lysis of adhesions causing obstruction of distal stomach. Patient initially noted improvement in pain, but then subsequently noted significant increase in pain not relieved by regimen (8/10). Patient provided with 1 dose of Oxycodone 5mg (patient noted that he sees Dr. Medina's pain management practice in [place] and takes oxycodone at home with good results). Otherwise no new complaints. Voiding freely. Tolerating small amounts of full liquid diet. Has not yet passed flatus or had BM. Denies fevers, chills, nausea or vomiting, chest pain, or shortness of breath.    MEDICATIONS  (STANDING):  acetaminophen   Tablet .. 975 milliGRAM(s) Oral every 8 hours  BUpivacaine liposome 1.3% Injectable 20 milliLiter(s) Local Injection once  calcium carbonate 1250 mG  + Vitamin D (OsCal 500 + D) 1 Tablet(s) Oral two times a day  enoxaparin Injectable 40 milliGRAM(s) SubCutaneous every 24 hours  gabapentin 300 milliGRAM(s) Oral three times a day  pantoprazole    Tablet 40 milliGRAM(s) Oral before breakfast  sodium chloride 0.9%. 1000 milliLiter(s) (72 mL/Hr) IV Continuous <Continuous>  tamsulosin 0.4 milliGRAM(s) Oral at bedtime    MEDICATIONS  (PRN):  cyclobenzaprine 5 milliGRAM(s) Oral daily PRN Muscle Spasm  ondansetron Injectable 4 milliGRAM(s) IV Push every 6 hours PRN Nausea      Vital Signs Last 24 Hrs  T(C): 36.7 (18 Mar 2021 23:40), Max: 38.2 (18 Mar 2021 13:53)  T(F): 98.1 (18 Mar 2021 23:40), Max: 100.8 (18 Mar 2021 13:53)  HR: 84 (18 Mar 2021 23:40) (72 - 94)  BP: 131/78 (18 Mar 2021 23:40) (110/81 - 152/84)  BP(mean): --  RR: 18 (18 Mar 2021 23:40) (15 - 23)  SpO2: 94% (18 Mar 2021 23:40) (94% - 100%)    Constitutional: patient resting in bed, in no acute distress  HEENT: EOMI / PERRL b/l, no active drainage or redness  Neck: No JVD, full ROM without pain  Respiratory: respirations are unlabored, no accessory muscle use, no conversational dyspnea  Cardiovascular: regular rate & rhythm  Gastrointestinal: Laparoscopic abdominal incisions with overlying Dermabond, c/d/i. Abdomen soft, nondistended, tender to palpation around incision sites without rebound or guarding  Neurological: GCS: 15 (4/5/6). A&O x 3; no gross sensory / motor / coordination deficits  Psychiatric: Normal mood, normal affect  Musculoskeletal: No joint pain, swelling or deformity; no limitation of movement      I&O's Detail    18 Mar 2021 07:01  -  19 Mar 2021 03:28  --------------------------------------------------------  IN:    Lactated Ringers: 225 mL  Total IN: 225 mL    OUT:    Indwelling Catheter - Urethral (mL): 400 mL    Voided (mL): 200 mL  Total OUT: 600 mL    Total NET: -375 mL          LABS:                        12.8   6.27  )-----------( 363      ( 18 Mar 2021 12:10 )             41.1     03-18    139  |  100  |  12.0  ----------------------------<  112<H>  3.5   |  24.0  |  0.75    Ca    9.0      18 Mar 2021 12:10    TPro  7.0  /  Alb  4.2  /  TBili  0.6  /  DBili  x   /  AST  30  /  ALT  14  /  AlkPhos  85  03-18    PT/INR - ( 18 Mar 2021 12:10 )   PT: 18.0 sec;   INR: 1.59 ratio         PTT - ( 18 Mar 2021 12:10 )  PTT:38.2 sec

## 2021-03-24 ENCOUNTER — INPATIENT (INPATIENT)
Facility: HOSPITAL | Age: 71
LOS: 2 days | Discharge: HOME | DRG: 178 | End: 2021-03-27
Attending: SURGERY | Admitting: SURGERY
Payer: MEDICARE

## 2021-03-24 ENCOUNTER — TRANSCRIPTION ENCOUNTER (OUTPATIENT)
Age: 71
End: 2021-03-24

## 2021-03-24 VITALS
SYSTOLIC BLOOD PRESSURE: 109 MMHG | HEIGHT: 76 IN | HEART RATE: 69 BPM | DIASTOLIC BLOOD PRESSURE: 77 MMHG | RESPIRATION RATE: 18 BRPM | WEIGHT: 177.91 LBS | OXYGEN SATURATION: 95 % | TEMPERATURE: 99 F

## 2021-03-24 DIAGNOSIS — Z98.890 OTHER SPECIFIED POSTPROCEDURAL STATES: Chronic | ICD-10-CM

## 2021-03-24 DIAGNOSIS — Z90.89 ACQUIRED ABSENCE OF OTHER ORGANS: Chronic | ICD-10-CM

## 2021-03-24 LAB
ALBUMIN SERPL ELPH-MCNC: 3.1 G/DL — LOW (ref 3.3–5.2)
ALP SERPL-CCNC: 66 U/L — SIGNIFICANT CHANGE UP (ref 40–120)
ALT FLD-CCNC: 10 U/L — SIGNIFICANT CHANGE UP
ANION GAP SERPL CALC-SCNC: 9 MMOL/L — SIGNIFICANT CHANGE UP (ref 5–17)
APTT BLD: 36.6 SEC — HIGH (ref 27.5–35.5)
AST SERPL-CCNC: 19 U/L — SIGNIFICANT CHANGE UP
BASOPHILS # BLD AUTO: 0.02 K/UL — SIGNIFICANT CHANGE UP (ref 0–0.2)
BASOPHILS NFR BLD AUTO: 0.4 % — SIGNIFICANT CHANGE UP (ref 0–2)
BILIRUB SERPL-MCNC: 0.5 MG/DL — SIGNIFICANT CHANGE UP (ref 0.4–2)
BLD GP AB SCN SERPL QL: SIGNIFICANT CHANGE UP
BUN SERPL-MCNC: 17 MG/DL — SIGNIFICANT CHANGE UP (ref 8–20)
CALCIUM SERPL-MCNC: 8.1 MG/DL — LOW (ref 8.6–10.2)
CHLORIDE SERPL-SCNC: 104 MMOL/L — SIGNIFICANT CHANGE UP (ref 98–107)
CO2 SERPL-SCNC: 24 MMOL/L — SIGNIFICANT CHANGE UP (ref 22–29)
CREAT SERPL-MCNC: 0.47 MG/DL — LOW (ref 0.5–1.3)
EOSINOPHIL # BLD AUTO: 0.18 K/UL — SIGNIFICANT CHANGE UP (ref 0–0.5)
EOSINOPHIL NFR BLD AUTO: 3.7 % — SIGNIFICANT CHANGE UP (ref 0–6)
GLUCOSE SERPL-MCNC: 97 MG/DL — SIGNIFICANT CHANGE UP (ref 70–99)
HCT VFR BLD CALC: 33 % — LOW (ref 39–50)
HGB BLD-MCNC: 10.7 G/DL — LOW (ref 13–17)
IMM GRANULOCYTES NFR BLD AUTO: 0.2 % — SIGNIFICANT CHANGE UP (ref 0–1.5)
INR BLD: 1.59 RATIO — HIGH (ref 0.88–1.16)
LIDOCAIN IGE QN: 52 U/L — HIGH (ref 22–51)
LYMPHOCYTES # BLD AUTO: 0.99 K/UL — LOW (ref 1–3.3)
LYMPHOCYTES # BLD AUTO: 20.6 % — SIGNIFICANT CHANGE UP (ref 13–44)
MCHC RBC-ENTMCNC: 32.3 PG — SIGNIFICANT CHANGE UP (ref 27–34)
MCHC RBC-ENTMCNC: 32.4 GM/DL — SIGNIFICANT CHANGE UP (ref 32–36)
MCV RBC AUTO: 99.7 FL — SIGNIFICANT CHANGE UP (ref 80–100)
MONOCYTES # BLD AUTO: 0.49 K/UL — SIGNIFICANT CHANGE UP (ref 0–0.9)
MONOCYTES NFR BLD AUTO: 10.2 % — SIGNIFICANT CHANGE UP (ref 2–14)
NEUTROPHILS # BLD AUTO: 3.12 K/UL — SIGNIFICANT CHANGE UP (ref 1.8–7.4)
NEUTROPHILS NFR BLD AUTO: 64.9 % — SIGNIFICANT CHANGE UP (ref 43–77)
PLATELET # BLD AUTO: 263 K/UL — SIGNIFICANT CHANGE UP (ref 150–400)
POTASSIUM SERPL-MCNC: 3.1 MMOL/L — LOW (ref 3.5–5.3)
POTASSIUM SERPL-SCNC: 3.1 MMOL/L — LOW (ref 3.5–5.3)
PROT SERPL-MCNC: 5.4 G/DL — LOW (ref 6.6–8.7)
PROTHROM AB SERPL-ACNC: 18 SEC — HIGH (ref 10.6–13.6)
RBC # BLD: 3.31 M/UL — LOW (ref 4.2–5.8)
RBC # FLD: 13.5 % — SIGNIFICANT CHANGE UP (ref 10.3–14.5)
SODIUM SERPL-SCNC: 137 MMOL/L — SIGNIFICANT CHANGE UP (ref 135–145)
WBC # BLD: 4.81 K/UL — SIGNIFICANT CHANGE UP (ref 3.8–10.5)
WBC # FLD AUTO: 4.81 K/UL — SIGNIFICANT CHANGE UP (ref 3.8–10.5)

## 2021-03-24 PROCEDURE — 99285 EMERGENCY DEPT VISIT HI MDM: CPT | Mod: CS

## 2021-03-24 PROCEDURE — 74019 RADEX ABDOMEN 2 VIEWS: CPT | Mod: 26

## 2021-03-24 RX ORDER — SODIUM CHLORIDE 9 MG/ML
1000 INJECTION INTRAMUSCULAR; INTRAVENOUS; SUBCUTANEOUS
Refills: 0 | Status: DISCONTINUED | OUTPATIENT
Start: 2021-03-24 | End: 2021-03-25

## 2021-03-24 RX ORDER — IOHEXOL 300 MG/ML
30 INJECTION, SOLUTION INTRAVENOUS ONCE
Refills: 0 | Status: COMPLETED | OUTPATIENT
Start: 2021-03-24 | End: 2021-03-24

## 2021-03-24 RX ADMIN — IOHEXOL 30 MILLILITER(S): 300 INJECTION, SOLUTION INTRAVENOUS at 23:19

## 2021-03-24 RX ADMIN — SODIUM CHLORIDE 125 MILLILITER(S): 9 INJECTION INTRAMUSCULAR; INTRAVENOUS; SUBCUTANEOUS at 21:22

## 2021-03-24 NOTE — ED PROVIDER NOTE - CLINICAL SUMMARY MEDICAL DECISION MAKING FREE TEXT BOX
Pt sent for eval given recent surgery with Dr. Moise. Labs, abdominal series repeated, surgery consulted and reassess.

## 2021-03-24 NOTE — H&P ADULT - ASSESSMENT
70 year old male s/p Robotically assisted hiatal hernia repair with Vadim Fundoplication and gastropexy on 2/17/21 and more recently diagnostic laparoscopy with JOVITA of gastric adhesion causing gastric outlet obstruction now presenting with 3 days of nausea and emesis.    -Concern for recurrence of hiatal hernia, possible stricture   -Admit to surgery   -NPO/IVFs  -GI consult to evaluate for endoscopy  -Zofran prn for nausea and emesis  -Serial Abdominal Exams   -CT Abd/Pelvis with PO contrast

## 2021-03-24 NOTE — ED PROVIDER NOTE - NS ED ROS FT
ROS: No fever/chills. No eye pain/changes in vision, No ear pain/sore throat/dysphagia, No chest pain/palpitations. No SOB/cough/. + abdominal pain and vomiting. no black/bloody bm. No dysuria/frequency/discharge, No headache. No Dizziness.    No rashes or breaks in skin. No numbness/tingling/weakness.

## 2021-03-24 NOTE — H&P ADULT - HISTORY OF PRESENT ILLNESS
70 year old male with the PMH as listed below but significant for Robotically assisted hiatal hernia repair with Vadim Fundoplication and gastropexy on 2/17/21 and more recently diagnostic laparoscopy with JOVITA of gastric adhesion causing gastric outlet obstruction now presenting with 3 days of nausea and emesis. Patient initially presented to [facility] and subsequently transferred to [facility]. Patient states that he has had abdominal discomfort , poor PO intake during the past 3 days. Last BM was 2 days ago, but passing flatus.     Currently denies SOB, chest pain, diarrhea, fevers nor chills.

## 2021-03-24 NOTE — ED PROVIDER NOTE - PHYSICAL EXAMINATION
Gen: No acute distress, non toxic  HEENT: Mucous membranes moist, pink conjunctivae, EOMI  CV: RRR, nl s1/s2.  Resp: CTAB, normal rate and effort  GI: Abdomen soft, minimal tenderness, no distension. healing surgical scars  : No CVAT  Neuro: A&O x 3, moving all 4 extremities  MSK: No spine or joint tenderness to palpation  Skin: No rashes. intact and perfused.

## 2021-03-24 NOTE — ED PROVIDER NOTE - OBJECTIVE STATEMENT
71y/o M with PMHx of Afib; on Eliquis, Hiatal hernia; with recent gastro plexy by Dr. Moise, infusion several days ago presents to the ED, transferred from [place] after abdominal series showed air fluids with possible ileus vs obstruction. Pt currently c/o abdominal discomfort with vomiting since this morning. Denies fever, chills or other symptoms.

## 2021-03-24 NOTE — ED ADULT NURSE NOTE - CHIEF COMPLAINT QUOTE
pt arrives by ambulance after transferred from [facility] ED with reports of SBO, c/o nausea and vomiting. pt with recent hiatal hernia repair 1 mo ago.

## 2021-03-24 NOTE — H&P ADULT - ATTENDING COMMENTS
I saw and examined the patient, and reviewed  the history, operative findings and data with the patient and staff  Agree with note which was also reviewed and edited where appropriate.  D/W patient, RN, residents.    unclear etiology of current symptoms.  CT w/o obstruction.    Will get GI to see, r/o ulcer dz vs gastroparesis.  D/w GI.

## 2021-03-24 NOTE — H&P ADULT - NSHPPHYSICALEXAM_GEN_ALL_CORE
General: Not in acute distress  HEENT: moist oral mucosa, EOMI  CV: RRR, S1, S2  Resp: no conversational dyspnea   Abd: nondistended, nontympanic, mild epigastric tenderness, no rebound tenderness, no guarding, non-peritoneal. Multiple well-healed laparoscopic/robotic port sites.   MSK: no pitting edema B/L  Neuro: AAOX3

## 2021-03-24 NOTE — ED ADULT NURSE NOTE - OBJECTIVE STATEMENT
Pt. with recent gastro plexy with MD Garber,  transferred from [place] after abdominal series showed air fluids with possible ileus vs obstruction. Pt. originally presented to ED for complaints of LUQ abdominal pain, nausea and vomiting since this morning.  Pt. denies other complaints on arrival.  NSR on CM; pt. denies chest pain at this time.

## 2021-03-25 ENCOUNTER — RESULT REVIEW (OUTPATIENT)
Age: 71
End: 2021-03-25

## 2021-03-25 DIAGNOSIS — R10.9 UNSPECIFIED ABDOMINAL PAIN: ICD-10-CM

## 2021-03-25 DIAGNOSIS — R11.2 NAUSEA WITH VOMITING, UNSPECIFIED: ICD-10-CM

## 2021-03-25 LAB
ANION GAP SERPL CALC-SCNC: 10 MMOL/L — SIGNIFICANT CHANGE UP (ref 5–17)
BASOPHILS # BLD AUTO: 0.02 K/UL — SIGNIFICANT CHANGE UP (ref 0–0.2)
BASOPHILS NFR BLD AUTO: 0.5 % — SIGNIFICANT CHANGE UP (ref 0–2)
BUN SERPL-MCNC: 12 MG/DL — SIGNIFICANT CHANGE UP (ref 8–20)
CALCIUM SERPL-MCNC: 7.8 MG/DL — LOW (ref 8.6–10.2)
CHLORIDE SERPL-SCNC: 104 MMOL/L — SIGNIFICANT CHANGE UP (ref 98–107)
CO2 SERPL-SCNC: 22 MMOL/L — SIGNIFICANT CHANGE UP (ref 22–29)
CREAT SERPL-MCNC: 0.55 MG/DL — SIGNIFICANT CHANGE UP (ref 0.5–1.3)
EOSINOPHIL # BLD AUTO: 0.15 K/UL — SIGNIFICANT CHANGE UP (ref 0–0.5)
EOSINOPHIL NFR BLD AUTO: 3.7 % — SIGNIFICANT CHANGE UP (ref 0–6)
GLUCOSE SERPL-MCNC: 95 MG/DL — SIGNIFICANT CHANGE UP (ref 70–99)
HCT VFR BLD CALC: 34.5 % — LOW (ref 39–50)
HGB BLD-MCNC: 11.1 G/DL — LOW (ref 13–17)
IMM GRANULOCYTES NFR BLD AUTO: 0.5 % — SIGNIFICANT CHANGE UP (ref 0–1.5)
INR BLD: 1.31 RATIO — HIGH (ref 0.88–1.16)
LYMPHOCYTES # BLD AUTO: 0.55 K/UL — LOW (ref 1–3.3)
LYMPHOCYTES # BLD AUTO: 13.5 % — SIGNIFICANT CHANGE UP (ref 13–44)
MAGNESIUM SERPL-MCNC: 1.7 MG/DL — SIGNIFICANT CHANGE UP (ref 1.6–2.6)
MCHC RBC-ENTMCNC: 31.9 PG — SIGNIFICANT CHANGE UP (ref 27–34)
MCHC RBC-ENTMCNC: 32.2 GM/DL — SIGNIFICANT CHANGE UP (ref 32–36)
MCV RBC AUTO: 99.1 FL — SIGNIFICANT CHANGE UP (ref 80–100)
MONOCYTES # BLD AUTO: 0.19 K/UL — SIGNIFICANT CHANGE UP (ref 0–0.9)
MONOCYTES NFR BLD AUTO: 4.7 % — SIGNIFICANT CHANGE UP (ref 2–14)
NEUTROPHILS # BLD AUTO: 3.15 K/UL — SIGNIFICANT CHANGE UP (ref 1.8–7.4)
NEUTROPHILS NFR BLD AUTO: 77.1 % — HIGH (ref 43–77)
PHOSPHATE SERPL-MCNC: 1.3 MG/DL — LOW (ref 2.4–4.7)
PLATELET # BLD AUTO: 246 K/UL — SIGNIFICANT CHANGE UP (ref 150–400)
POTASSIUM SERPL-MCNC: 3.5 MMOL/L — SIGNIFICANT CHANGE UP (ref 3.5–5.3)
POTASSIUM SERPL-SCNC: 3.5 MMOL/L — SIGNIFICANT CHANGE UP (ref 3.5–5.3)
PROTHROM AB SERPL-ACNC: 15 SEC — HIGH (ref 10.6–13.6)
RBC # BLD: 3.48 M/UL — LOW (ref 4.2–5.8)
RBC # FLD: 13.6 % — SIGNIFICANT CHANGE UP (ref 10.3–14.5)
SARS-COV-2 RNA SPEC QL NAA+PROBE: SIGNIFICANT CHANGE UP
SODIUM SERPL-SCNC: 136 MMOL/L — SIGNIFICANT CHANGE UP (ref 135–145)
WBC # BLD: 4.08 K/UL — SIGNIFICANT CHANGE UP (ref 3.8–10.5)
WBC # FLD AUTO: 4.08 K/UL — SIGNIFICANT CHANGE UP (ref 3.8–10.5)

## 2021-03-25 PROCEDURE — 43239 EGD BIOPSY SINGLE/MULTIPLE: CPT

## 2021-03-25 PROCEDURE — 88342 IMHCHEM/IMCYTCHM 1ST ANTB: CPT | Mod: 26

## 2021-03-25 PROCEDURE — 99223 1ST HOSP IP/OBS HIGH 75: CPT | Mod: 25

## 2021-03-25 PROCEDURE — 88305 TISSUE EXAM BY PATHOLOGIST: CPT | Mod: 26

## 2021-03-25 PROCEDURE — 74176 CT ABD & PELVIS W/O CONTRAST: CPT | Mod: 26

## 2021-03-25 RX ORDER — SENNA PLUS 8.6 MG/1
2 TABLET ORAL AT BEDTIME
Refills: 0 | Status: DISCONTINUED | OUTPATIENT
Start: 2021-03-25 | End: 2021-03-27

## 2021-03-25 RX ORDER — ENOXAPARIN SODIUM 100 MG/ML
40 INJECTION SUBCUTANEOUS EVERY 24 HOURS
Refills: 0 | Status: DISCONTINUED | OUTPATIENT
Start: 2021-03-25 | End: 2021-03-25

## 2021-03-25 RX ORDER — PREGABALIN 225 MG/1
1000 CAPSULE ORAL DAILY
Refills: 0 | Status: DISCONTINUED | OUTPATIENT
Start: 2021-03-25 | End: 2021-03-27

## 2021-03-25 RX ORDER — GABAPENTIN 400 MG/1
300 CAPSULE ORAL
Refills: 0 | Status: DISCONTINUED | OUTPATIENT
Start: 2021-03-25 | End: 2021-03-27

## 2021-03-25 RX ORDER — POTASSIUM CHLORIDE 20 MEQ
10 PACKET (EA) ORAL
Refills: 0 | Status: COMPLETED | OUTPATIENT
Start: 2021-03-25 | End: 2021-03-25

## 2021-03-25 RX ORDER — SODIUM CHLORIDE 9 MG/ML
1000 INJECTION INTRAMUSCULAR; INTRAVENOUS; SUBCUTANEOUS
Refills: 0 | Status: DISCONTINUED | OUTPATIENT
Start: 2021-03-25 | End: 2021-03-25

## 2021-03-25 RX ORDER — HYDROMORPHONE HYDROCHLORIDE 2 MG/ML
1 INJECTION INTRAMUSCULAR; INTRAVENOUS; SUBCUTANEOUS EVERY 4 HOURS
Refills: 0 | Status: DISCONTINUED | OUTPATIENT
Start: 2021-03-25 | End: 2021-03-26

## 2021-03-25 RX ORDER — HYDROMORPHONE HYDROCHLORIDE 2 MG/ML
0.5 INJECTION INTRAMUSCULAR; INTRAVENOUS; SUBCUTANEOUS EVERY 4 HOURS
Refills: 0 | Status: DISCONTINUED | OUTPATIENT
Start: 2021-03-25 | End: 2021-03-26

## 2021-03-25 RX ORDER — LORATADINE 10 MG/1
10 TABLET ORAL DAILY
Refills: 0 | Status: DISCONTINUED | OUTPATIENT
Start: 2021-03-25 | End: 2021-03-27

## 2021-03-25 RX ORDER — TAMSULOSIN HYDROCHLORIDE 0.4 MG/1
0.4 CAPSULE ORAL
Refills: 0 | Status: DISCONTINUED | OUTPATIENT
Start: 2021-03-25 | End: 2021-03-27

## 2021-03-25 RX ORDER — MAGNESIUM SULFATE 500 MG/ML
2 VIAL (ML) INJECTION ONCE
Refills: 0 | Status: COMPLETED | OUTPATIENT
Start: 2021-03-25 | End: 2021-03-25

## 2021-03-25 RX ORDER — ACETAMINOPHEN 500 MG
1000 TABLET ORAL ONCE
Refills: 0 | Status: COMPLETED | OUTPATIENT
Start: 2021-03-25 | End: 2021-03-25

## 2021-03-25 RX ORDER — ONDANSETRON 8 MG/1
4 TABLET, FILM COATED ORAL EVERY 6 HOURS
Refills: 0 | Status: DISCONTINUED | OUTPATIENT
Start: 2021-03-25 | End: 2021-03-27

## 2021-03-25 RX ORDER — FOLIC ACID 0.8 MG
1 TABLET ORAL DAILY
Refills: 0 | Status: DISCONTINUED | OUTPATIENT
Start: 2021-03-25 | End: 2021-03-27

## 2021-03-25 RX ORDER — SODIUM CHLORIDE 9 MG/ML
1000 INJECTION, SOLUTION INTRAVENOUS
Refills: 0 | Status: DISCONTINUED | OUTPATIENT
Start: 2021-03-25 | End: 2021-03-26

## 2021-03-25 RX ORDER — PANTOPRAZOLE SODIUM 20 MG/1
40 TABLET, DELAYED RELEASE ORAL EVERY 12 HOURS
Refills: 0 | Status: DISCONTINUED | OUTPATIENT
Start: 2021-03-25 | End: 2021-03-27

## 2021-03-25 RX ORDER — CHOLECALCIFEROL (VITAMIN D3) 125 MCG
2000 CAPSULE ORAL DAILY
Refills: 0 | Status: DISCONTINUED | OUTPATIENT
Start: 2021-03-25 | End: 2021-03-27

## 2021-03-25 RX ORDER — POTASSIUM PHOSPHATE, MONOBASIC POTASSIUM PHOSPHATE, DIBASIC 236; 224 MG/ML; MG/ML
30 INJECTION, SOLUTION INTRAVENOUS ONCE
Refills: 0 | Status: COMPLETED | OUTPATIENT
Start: 2021-03-25 | End: 2021-03-25

## 2021-03-25 RX ORDER — PHYTONADIONE (VIT K1) 5 MG
10 TABLET ORAL ONCE
Refills: 0 | Status: COMPLETED | OUTPATIENT
Start: 2021-03-25 | End: 2021-03-25

## 2021-03-25 RX ADMIN — Medication 1 TABLET(S): at 12:59

## 2021-03-25 RX ADMIN — Medication 1 MILLIGRAM(S): at 12:58

## 2021-03-25 RX ADMIN — GABAPENTIN 300 MILLIGRAM(S): 400 CAPSULE ORAL at 18:42

## 2021-03-25 RX ADMIN — Medication 400 MILLIGRAM(S): at 12:56

## 2021-03-25 RX ADMIN — Medication 2000 UNIT(S): at 12:58

## 2021-03-25 RX ADMIN — GABAPENTIN 300 MILLIGRAM(S): 400 CAPSULE ORAL at 05:56

## 2021-03-25 RX ADMIN — POTASSIUM PHOSPHATE, MONOBASIC POTASSIUM PHOSPHATE, DIBASIC 83.33 MILLIMOLE(S): 236; 224 INJECTION, SOLUTION INTRAVENOUS at 12:27

## 2021-03-25 RX ADMIN — Medication 100 MILLIEQUIVALENT(S): at 02:27

## 2021-03-25 RX ADMIN — TAMSULOSIN HYDROCHLORIDE 0.4 MILLIGRAM(S): 0.4 CAPSULE ORAL at 05:53

## 2021-03-25 RX ADMIN — HYDROMORPHONE HYDROCHLORIDE 1 MILLIGRAM(S): 2 INJECTION INTRAMUSCULAR; INTRAVENOUS; SUBCUTANEOUS at 15:18

## 2021-03-25 RX ADMIN — Medication 1000 MILLIGRAM(S): at 13:56

## 2021-03-25 RX ADMIN — PANTOPRAZOLE SODIUM 40 MILLIGRAM(S): 20 TABLET, DELAYED RELEASE ORAL at 18:42

## 2021-03-25 RX ADMIN — HYDROMORPHONE HYDROCHLORIDE 1 MILLIGRAM(S): 2 INJECTION INTRAMUSCULAR; INTRAVENOUS; SUBCUTANEOUS at 14:18

## 2021-03-25 RX ADMIN — ONDANSETRON 4 MILLIGRAM(S): 8 TABLET, FILM COATED ORAL at 12:41

## 2021-03-25 RX ADMIN — Medication 50 GRAM(S): at 12:13

## 2021-03-25 RX ADMIN — Medication 100 MILLIEQUIVALENT(S): at 03:47

## 2021-03-25 RX ADMIN — PREGABALIN 1000 MICROGRAM(S): 225 CAPSULE ORAL at 12:59

## 2021-03-25 RX ADMIN — SODIUM CHLORIDE 125 MILLILITER(S): 9 INJECTION, SOLUTION INTRAVENOUS at 04:42

## 2021-03-25 RX ADMIN — Medication 100 MILLIEQUIVALENT(S): at 01:12

## 2021-03-25 RX ADMIN — TAMSULOSIN HYDROCHLORIDE 0.4 MILLIGRAM(S): 0.4 CAPSULE ORAL at 18:42

## 2021-03-25 RX ADMIN — Medication 102 MILLIGRAM(S): at 08:49

## 2021-03-25 NOTE — CONSULT NOTE ADULT - SUBJECTIVE AND OBJECTIVE BOX
Patient is a 70y old  Male who presents with a chief complaint of nausea and vomiting    HPI:  70 year old male with the PMH as listed below but significant for Robotically assisted hiatal hernia repair with Fundoplication and gastropexy on 2/17/21 and more recently diagnostic laparoscopy with JOVITA of gastric adhesion causing gastric outlet obstruction now presenting with 3 days of nausea and emesis. Patient initially presented to [facility] and subsequently transferred to [facility]. Patient states that he has had abdominal discomfort , poor PO intake during the past 3 days. Last BM was 2 days ago, but passing flatus. Currently denies SOB, chest pain, diarrhea, fevers nor chills. He has had questionable hematemesis. No associated abdominal pain. Pt. not very cooperative in terms of providing history this morning as he appeared to just want to sleep. Unknown if pt. had EGD prior to the above fundoplication and gastropexy in 02/2021 but he probably did. Again pt. doesn't remember but he was also uncooperative for this history and subsequent physical examination. CT here negative for free air or obstruction.      REVIEW OF SYSTEMS:  Constitutional: No fever, weight loss or fatigue  ENMT:  No difficulty hearing, tinnitus, vertigo; No sinus or throat pain  Respiratory: No cough, wheezing, chills or hemoptysis  Cardiovascular: No chest pain, palpitations, dizziness or leg swelling  Gastrointestinal: As per HPI.  Musculoskeletal: No joint pain or swelling; No muscle, back or extremity pain  Patient has no cardiopulmonary, peripheral vascular, musculoskeletal, dermatological, neurological, or psychological symptoms or complaints at this time.    PAST MEDICAL & SURGICAL HISTORY:  As outlined above.    Allergic bronchitis    At risk for sleep apnea  Bang score 4    Chronic diarrhea    History of gastroesophageal reflux (GERD)    Hypothyroidism  Resolved at 12 years old    Prostate CA  Stage 4    Osteoarthritis    Rheumatoid arthritis    Afib    History of facial surgery    H/O knee surgery  x3    History of tonsillectomy  as a child        FAMILY HISTORY:  FH: diabetes mellitus (Sibling)    Family history of glioblastoma (Sibling)    FH: heart disease (Father)        SOCIAL HISTORY:  Smoking Status: [ ] Current, [ ] Former, [ x] Never  Pack Years: N/A. No ETOH or drug abuse history.    MEDICATIONS:  MEDICATIONS  (STANDING):  calcium carbonate 1250 mG  + Vitamin D (OsCal 500 + D) 1 Tablet(s) Oral daily  cholecalciferol Oral Tab/Cap - Peds 2000 Unit(s) Oral daily  cyanocobalamin 1000 MICROGram(s) Oral daily  enoxaparin Injectable 40 milliGRAM(s) SubCutaneous every 24 hours  folic acid 1 milliGRAM(s) Oral daily  gabapentin 300 milliGRAM(s) Oral two times a day  lactated ringers 1000 milliLiter(s) (125 mL/Hr) IV Continuous <Continuous>  senna 2 Tablet(s) Oral at bedtime  tamsulosin 0.4 milliGRAM(s) Oral two times a day    MEDICATIONS  (PRN):  loratadine 10 milliGRAM(s) Oral daily PRN allergies      Allergies    codeine (Vomiting; Nausea)  IV Contrast (Anaphylaxis)  Metamucil (Other)  Pollen/mold (Pruritus)  prochlorperazine (Other)  shellfish (Anaphylaxis)  Silk (Pruritus)  tetanus toxoid (Anaphylaxis)    Intolerances        Vital Signs Last 24 Hrs  T(C): 36.6 (25 Mar 2021 05:52), Max: 37 (24 Mar 2021 20:33)  T(F): 97.9 (25 Mar 2021 05:52), Max: 98.6 (24 Mar 2021 20:33)  HR: 60 (25 Mar 2021 05:52) (60 - 69)  BP: 105/67 (25 Mar 2021 05:52) (105/67 - 109/77)  BP(mean): --  RR: 18 (25 Mar 2021 05:52) (18 - 18)  SpO2: 94% (25 Mar 2021 05:52) (94% - 95%)        PHYSICAL EXAM:    General: Well developed; well nourished; in no acute distress.  HEENT: MMM, conjunctiva pink  and sclera anicteric.  Lungs: Clear bilaterally.  Cor: RRR S1, S2 only.  Gastrointestinal: Abdomen: Soft, non-tender non-distended; Normal bowel sounds; No rebound or guarding or HSM. Recent laparoscopy scars noted.  MAGALY: Pt. refused.  Extremities: Normal range of motion, No clubbing, cyanosis or edema  Neurological: Alert and oriented x3  Skin: Warm and dry. No obvious rash      LABS:                        10.7   4.81  )-----------( 263      ( 24 Mar 2021 21:25 )             33.0     03-24    137  |  104  |  17.0  ----------------------------<  97  3.1<L>   |  24.0  |  0.47<L>    Ca    8.1<L>      24 Mar 2021 21:25    TPro  5.4<L>  /  Alb  3.1<L>  /  TBili  0.5  /  DBili  x   /  AST  19  /  ALT  10  /  AlkPhos  66  03-24          RADIOLOGY & ADDITIONAL STUDIES:   CT results noted above.

## 2021-03-25 NOTE — CONSULT NOTE ADULT - PROBLEM SELECTOR RECOMMENDATION 9
With possible hematemesis w/o obvious obstruction on CT scan, r/o esophagitis +/or gastritis +/or ulcer disease. Keep NPO for EGD which we will try to do later today depending upon endoscopy unit logistics. IV Pantoprazole.

## 2021-03-25 NOTE — BRIEF OPERATIVE NOTE - OPERATION/FINDINGS
Some contrast colored fluid in the esophagus which was aspirated with normal underlying mucosa. The esophagus was slightly tortuous GE junction appeared normal. Small amount of vegetable matter in the prepyloric area. Normal post op change in the cardia. Fair amount of contrast colored fluid in the fundus which was aspirated as well. Multiple circular areas of subepithelial ecchymosis c/w NG suction artifact. No other abnormalities

## 2021-03-25 NOTE — PROGRESS NOTE ADULT - ASSESSMENT
70 year old male s/p Robotically assisted hiatal hernia repair with Vadim Fundoplication and gastropexy on 2/17/21 and more recently diagnostic laparoscopy with JOVITA of gastric adhesion causing gastric outlet obstruction now presenting with 3 days of nausea and emesis.    -NPO/IVFs  -GI consult to evaluate for endoscopy  -Zofran prn for nausea and emesis  -Serial Abdominal Exams   -CT Abd/Pelvis with PO contrast

## 2021-03-25 NOTE — BRIEF OPERATIVE NOTE - NSICDXBRIEFPREOP_GEN_ALL_CORE_FT
PRE-OP DIAGNOSIS:  Nausea and vomiting 25-Mar-2021 15:56:31  Jorge Miller  Chronic epigastric pain 25-Mar-2021 15:56:17  Jorge Miller

## 2021-03-25 NOTE — CONSULT NOTE ADULT - TIME BILLING
See above. Will try to proceed with EGD later today depending upon Endoscopy Unit Logistics and availability. Keep NPO for now. IV Pantoprazole and IV Zofran PRN N/V

## 2021-03-25 NOTE — PROGRESS NOTE ADULT - SUBJECTIVE AND OBJECTIVE BOX
INTERVAL HPI/OVERNIGHT EVENTS:    Patient admitted overnight, no acute events reported. Patient with nausea and emesis controlled, continues c/o of abdominal discomfort.       MEDICATIONS  (STANDING):  calcium carbonate 1250 mG  + Vitamin D (OsCal 500 + D) 1 Tablet(s) Oral daily  cholecalciferol Oral Tab/Cap - Peds 2000 Unit(s) Oral daily  cyanocobalamin 1000 MICROGram(s) Oral daily  enoxaparin Injectable 40 milliGRAM(s) SubCutaneous every 24 hours  folic acid 1 milliGRAM(s) Oral daily  gabapentin 300 milliGRAM(s) Oral two times a day  senna 2 Tablet(s) Oral at bedtime  sodium chloride 0.9%. 1000 milliLiter(s) (75 mL/Hr) IV Continuous <Continuous>  sodium chloride 0.9%. 1000 milliLiter(s) (125 mL/Hr) IV Continuous <Continuous>  tamsulosin 0.4 milliGRAM(s) Oral two times a day    MEDICATIONS  (PRN):  loratadine 10 milliGRAM(s) Oral daily PRN allergies      Vital Signs Last 24 Hrs  T(C): 37 (24 Mar 2021 20:33), Max: 37 (24 Mar 2021 20:33)  T(F): 98.6 (24 Mar 2021 20:33), Max: 98.6 (24 Mar 2021 20:33)  HR: 69 (24 Mar 2021 20:33) (69 - 69)  BP: 109/77 (24 Mar 2021 20:33) (109/77 - 109/77)  BP(mean): --  RR: 18 (24 Mar 2021 20:33) (18 - 18)  SpO2: 95% (24 Mar 2021 20:33) (95% - 95%)    PE  HEENT: moist oral mucosa, EOMI  CV: RRR, S1, S2  Resp: no conversational dyspnea   Abd: nondistended, nontympanic, mild epigastric tenderness, no rebound tenderness, no guarding, non-peritoneal. Multiple well-healed laparoscopic/robotic port sites.   MSK: no pitting edema B/L  Neuro: AAOX3      I&O's Detail      LABS:                        10.7   4.81  )-----------( 263      ( 24 Mar 2021 21:25 )             33.0     03-24    137  |  104  |  17.0  ----------------------------<  97  3.1<L>   |  24.0  |  0.47<L>    Ca    8.1<L>      24 Mar 2021 21:25    TPro  5.4<L>  /  Alb  3.1<L>  /  TBili  0.5  /  DBili  x   /  AST  19  /  ALT  10  /  AlkPhos  66  03-24    PT/INR - ( 24 Mar 2021 21:25 )   PT: 18.0 sec;   INR: 1.59 ratio         PTT - ( 24 Mar 2021 21:25 )  PTT:36.6 sec      RADIOLOGY & ADDITIONAL STUDIES:

## 2021-03-26 ENCOUNTER — TRANSCRIPTION ENCOUNTER (OUTPATIENT)
Age: 71
End: 2021-03-26

## 2021-03-26 LAB
ANION GAP SERPL CALC-SCNC: 11 MMOL/L — SIGNIFICANT CHANGE UP (ref 5–17)
BASOPHILS # BLD AUTO: 0.02 K/UL — SIGNIFICANT CHANGE UP (ref 0–0.2)
BASOPHILS NFR BLD AUTO: 0.5 % — SIGNIFICANT CHANGE UP (ref 0–2)
BUN SERPL-MCNC: 6 MG/DL — LOW (ref 8–20)
CALCIUM SERPL-MCNC: 8 MG/DL — LOW (ref 8.6–10.2)
CHLORIDE SERPL-SCNC: 101 MMOL/L — SIGNIFICANT CHANGE UP (ref 98–107)
CO2 SERPL-SCNC: 24 MMOL/L — SIGNIFICANT CHANGE UP (ref 22–29)
COVID-19 SPIKE DOMAIN AB INTERP: NEGATIVE — SIGNIFICANT CHANGE UP
COVID-19 SPIKE DOMAIN ANTIBODY RESULT: 0.4 U/ML — SIGNIFICANT CHANGE UP
CREAT SERPL-MCNC: 0.63 MG/DL — SIGNIFICANT CHANGE UP (ref 0.5–1.3)
EOSINOPHIL # BLD AUTO: 0.06 K/UL — SIGNIFICANT CHANGE UP (ref 0–0.5)
EOSINOPHIL NFR BLD AUTO: 1.6 % — SIGNIFICANT CHANGE UP (ref 0–6)
GLUCOSE SERPL-MCNC: 92 MG/DL — SIGNIFICANT CHANGE UP (ref 70–99)
GRAM STN FLD: SIGNIFICANT CHANGE UP
HCT VFR BLD CALC: 35.2 % — LOW (ref 39–50)
HGB BLD-MCNC: 11.4 G/DL — LOW (ref 13–17)
IMM GRANULOCYTES NFR BLD AUTO: 0.3 % — SIGNIFICANT CHANGE UP (ref 0–1.5)
LYMPHOCYTES # BLD AUTO: 0.63 K/UL — LOW (ref 1–3.3)
LYMPHOCYTES # BLD AUTO: 16.6 % — SIGNIFICANT CHANGE UP (ref 13–44)
MAGNESIUM SERPL-MCNC: 2.1 MG/DL — SIGNIFICANT CHANGE UP (ref 1.6–2.6)
MCHC RBC-ENTMCNC: 32.4 GM/DL — SIGNIFICANT CHANGE UP (ref 32–36)
MCHC RBC-ENTMCNC: 32.4 PG — SIGNIFICANT CHANGE UP (ref 27–34)
MCV RBC AUTO: 100 FL — SIGNIFICANT CHANGE UP (ref 80–100)
MONOCYTES # BLD AUTO: 0.17 K/UL — SIGNIFICANT CHANGE UP (ref 0–0.9)
MONOCYTES NFR BLD AUTO: 4.5 % — SIGNIFICANT CHANGE UP (ref 2–14)
NEUTROPHILS # BLD AUTO: 2.91 K/UL — SIGNIFICANT CHANGE UP (ref 1.8–7.4)
NEUTROPHILS NFR BLD AUTO: 76.5 % — SIGNIFICANT CHANGE UP (ref 43–77)
PHOSPHATE SERPL-MCNC: 1.2 MG/DL — LOW (ref 2.4–4.7)
PLATELET # BLD AUTO: 243 K/UL — SIGNIFICANT CHANGE UP (ref 150–400)
POTASSIUM SERPL-MCNC: 3.9 MMOL/L — SIGNIFICANT CHANGE UP (ref 3.5–5.3)
POTASSIUM SERPL-SCNC: 3.9 MMOL/L — SIGNIFICANT CHANGE UP (ref 3.5–5.3)
RBC # BLD: 3.52 M/UL — LOW (ref 4.2–5.8)
RBC # FLD: 13.2 % — SIGNIFICANT CHANGE UP (ref 10.3–14.5)
SARS-COV-2 IGG+IGM SERPL QL IA: 0.4 U/ML — SIGNIFICANT CHANGE UP
SARS-COV-2 IGG+IGM SERPL QL IA: NEGATIVE — SIGNIFICANT CHANGE UP
SODIUM SERPL-SCNC: 136 MMOL/L — SIGNIFICANT CHANGE UP (ref 135–145)
SPECIMEN SOURCE: SIGNIFICANT CHANGE UP
WBC # BLD: 3.8 K/UL — SIGNIFICANT CHANGE UP (ref 3.8–10.5)
WBC # FLD AUTO: 3.8 K/UL — SIGNIFICANT CHANGE UP (ref 3.8–10.5)

## 2021-03-26 PROCEDURE — 49405 IMAGE CATH FLUID COLXN VISC: CPT

## 2021-03-26 PROCEDURE — 71045 X-RAY EXAM CHEST 1 VIEW: CPT | Mod: 26

## 2021-03-26 PROCEDURE — 99233 SBSQ HOSP IP/OBS HIGH 50: CPT

## 2021-03-26 RX ORDER — OXYCODONE HYDROCHLORIDE 5 MG/1
10 TABLET ORAL EVERY 4 HOURS
Refills: 0 | Status: DISCONTINUED | OUTPATIENT
Start: 2021-03-26 | End: 2021-03-27

## 2021-03-26 RX ORDER — SODIUM CHLORIDE 9 MG/ML
1000 INJECTION, SOLUTION INTRAVENOUS
Refills: 0 | Status: DISCONTINUED | OUTPATIENT
Start: 2021-03-27 | End: 2021-03-27

## 2021-03-26 RX ORDER — ENOXAPARIN SODIUM 100 MG/ML
40 INJECTION SUBCUTANEOUS DAILY
Refills: 0 | Status: DISCONTINUED | OUTPATIENT
Start: 2021-03-26 | End: 2021-03-27

## 2021-03-26 RX ORDER — SODIUM CHLORIDE 9 MG/ML
1000 INJECTION, SOLUTION INTRAVENOUS
Refills: 0 | Status: DISCONTINUED | OUTPATIENT
Start: 2021-03-26 | End: 2021-03-27

## 2021-03-26 RX ORDER — KETOROLAC TROMETHAMINE 30 MG/ML
15 SYRINGE (ML) INJECTION EVERY 6 HOURS
Refills: 0 | Status: COMPLETED | OUTPATIENT
Start: 2021-03-26 | End: 2021-03-27

## 2021-03-26 RX ORDER — ACETAMINOPHEN 500 MG
975 TABLET ORAL EVERY 8 HOURS
Refills: 0 | Status: DISCONTINUED | OUTPATIENT
Start: 2021-03-26 | End: 2021-03-27

## 2021-03-26 RX ORDER — OXYCODONE HYDROCHLORIDE 5 MG/1
5 TABLET ORAL EVERY 6 HOURS
Refills: 0 | Status: DISCONTINUED | OUTPATIENT
Start: 2021-03-26 | End: 2021-03-27

## 2021-03-26 RX ADMIN — Medication 975 MILLIGRAM(S): at 23:00

## 2021-03-26 RX ADMIN — GABAPENTIN 300 MILLIGRAM(S): 400 CAPSULE ORAL at 05:54

## 2021-03-26 RX ADMIN — ONDANSETRON 4 MILLIGRAM(S): 8 TABLET, FILM COATED ORAL at 01:40

## 2021-03-26 RX ADMIN — Medication 15 MILLIGRAM(S): at 23:35

## 2021-03-26 RX ADMIN — Medication 15 MILLIGRAM(S): at 17:44

## 2021-03-26 RX ADMIN — Medication 1 TABLET(S): at 13:15

## 2021-03-26 RX ADMIN — OXYCODONE HYDROCHLORIDE 5 MILLIGRAM(S): 5 TABLET ORAL at 23:06

## 2021-03-26 RX ADMIN — OXYCODONE HYDROCHLORIDE 5 MILLIGRAM(S): 5 TABLET ORAL at 22:06

## 2021-03-26 RX ADMIN — SODIUM CHLORIDE 125 MILLILITER(S): 9 INJECTION, SOLUTION INTRAVENOUS at 02:25

## 2021-03-26 RX ADMIN — SENNA PLUS 2 TABLET(S): 8.6 TABLET ORAL at 22:00

## 2021-03-26 RX ADMIN — GABAPENTIN 300 MILLIGRAM(S): 400 CAPSULE ORAL at 17:44

## 2021-03-26 RX ADMIN — PANTOPRAZOLE SODIUM 40 MILLIGRAM(S): 20 TABLET, DELAYED RELEASE ORAL at 17:44

## 2021-03-26 RX ADMIN — Medication 85 MILLIMOLE(S): at 13:15

## 2021-03-26 RX ADMIN — Medication 1 MILLIGRAM(S): at 13:15

## 2021-03-26 RX ADMIN — Medication 15 MILLIGRAM(S): at 17:59

## 2021-03-26 RX ADMIN — PANTOPRAZOLE SODIUM 40 MILLIGRAM(S): 20 TABLET, DELAYED RELEASE ORAL at 05:57

## 2021-03-26 RX ADMIN — SODIUM CHLORIDE 50 MILLILITER(S): 9 INJECTION, SOLUTION INTRAVENOUS at 15:54

## 2021-03-26 RX ADMIN — ENOXAPARIN SODIUM 40 MILLIGRAM(S): 100 INJECTION SUBCUTANEOUS at 22:06

## 2021-03-26 RX ADMIN — HYDROMORPHONE HYDROCHLORIDE 1 MILLIGRAM(S): 2 INJECTION INTRAMUSCULAR; INTRAVENOUS; SUBCUTANEOUS at 01:40

## 2021-03-26 RX ADMIN — OXYCODONE HYDROCHLORIDE 5 MILLIGRAM(S): 5 TABLET ORAL at 13:16

## 2021-03-26 RX ADMIN — Medication 975 MILLIGRAM(S): at 22:00

## 2021-03-26 RX ADMIN — OXYCODONE HYDROCHLORIDE 5 MILLIGRAM(S): 5 TABLET ORAL at 13:46

## 2021-03-26 RX ADMIN — Medication 2000 UNIT(S): at 13:15

## 2021-03-26 RX ADMIN — Medication 15 MILLIGRAM(S): at 23:18

## 2021-03-26 RX ADMIN — SODIUM CHLORIDE 50 MILLILITER(S): 9 INJECTION, SOLUTION INTRAVENOUS at 21:59

## 2021-03-26 RX ADMIN — TAMSULOSIN HYDROCHLORIDE 0.4 MILLIGRAM(S): 0.4 CAPSULE ORAL at 17:44

## 2021-03-26 RX ADMIN — PREGABALIN 1000 MICROGRAM(S): 225 CAPSULE ORAL at 13:15

## 2021-03-26 RX ADMIN — TAMSULOSIN HYDROCHLORIDE 0.4 MILLIGRAM(S): 0.4 CAPSULE ORAL at 05:54

## 2021-03-26 NOTE — PROCEDURE NOTE - PROCEDURE FINDINGS AND DETAILS
40 cc NS hydrodissection anf blunt tipped needle used to navigate past and displace the right lung to enter the mediastinal collection.  10 F drian placed.  300 cc bloody fluid (no clots), like old hematoma  cx sent

## 2021-03-26 NOTE — PROGRESS NOTE ADULT - ASSESSMENT
70 year old male s/p Robotically assisted hiatal hernia repair with Vadim Fundoplication and gastropexy on 2/17/21 and more recently diagnostic laparoscopy with JOVITA of gastric adhesion causing gastric outlet obstruction now presenting with 3 days of nausea and emesis. S/p EGD on 3/25 with biopsy of the antrum of the stomach.     -NPO/IVFs  -Esophagogram today 3/26 with GI.   -Zofran prn for nausea and emesis  -Serial Abdominal Exams  70 year old male s/p Robotically assisted hiatal hernia repair with Vadim Fundoplication and gastropexy on 2/17/21 and more recently diagnostic laparoscopy with JOVITA of gastric adhesion causing gastric outlet obstruction now presenting with 3 days of nausea and emesis. S/p EGD on 3/25 with biopsy of the antrum of the stomach.     -NPO/IVFs  -Esophagogram today 3/26 to r/o dysmotility   -Zofran prn for nausea and emesis  -Serial Abdominal Exams   D/c narcotics.

## 2021-03-26 NOTE — PROGRESS NOTE ADULT - SUBJECTIVE AND OBJECTIVE BOX
Patient is a 70y old  Male who presents with a chief complaint of     HPI:  70 year old male with the PMH as listed below but significant for Robotically assisted hiatal hernia repair with Vadim Fundoplication and gastropexy on 2/17/21 and more recently diagnostic laparoscopy with JOVITA of gastric adhesion causing gastric outlet obstruction now presenting with 3 days of nausea and emesis.  EGD yesterday showed tortuous esophagus - other wise normal post op stomach.  NO further vomiting after EGD           REVIEW OF SYSTEMS:  Constitutional: No fever, weight loss or fatigue  ENMT:  No difficulty hearing, tinnitus, vertigo; No sinus or throat pain  Respiratory: No cough, wheezing, chills or hemoptysis  Cardiovascular: No chest pain, palpitations, dizziness or leg swelling  Gastrointestinal: No abdominal or epigastric pain. + nausea,  ; No diarrhea or constipation. No melena or hematochezia.  Skin: No itching, burning, rashes or lesions   Musculoskeletal: No joint pain or swelling; No muscle, back or extremity pain    PAST MEDICAL & SURGICAL HISTORY:  Allergic bronchitis    At risk for sleep apnea  Bang score 4    Chronic diarrhea    History of gastroesophageal reflux (GERD)    Hypothyroidism  Resolved at 12 years old    Prostate CA  Stage 4    Osteoarthritis    Rheumatoid arthritis    Afib    History of facial surgery    H/O knee surgery  x3    History of tonsillectomy  as a child        FAMILY HISTORY:  FH: diabetes mellitus (Sibling)    Family history of glioblastoma (Sibling)    FH: heart disease (Father)        SOCIAL HISTORY:  Smoking Status: [ ] Current, [ ] Former, [ ] Never  Pack Years:  [  ] EtOH-no  [  ] IVDA    MEDICATIONS:  MEDICATIONS  (STANDING):  calcium carbonate 1250 mG  + Vitamin D (OsCal 500 + D) 1 Tablet(s) Oral daily  cholecalciferol Oral Tab/Cap - Peds 2000 Unit(s) Oral daily  cyanocobalamin 1000 MICROGram(s) Oral daily  folic acid 1 milliGRAM(s) Oral daily  gabapentin 300 milliGRAM(s) Oral two times a day  lactated ringers 1000 milliLiter(s) (125 mL/Hr) IV Continuous <Continuous>  pantoprazole  Injectable 40 milliGRAM(s) IV Push every 12 hours  senna 2 Tablet(s) Oral at bedtime  tamsulosin 0.4 milliGRAM(s) Oral two times a day    MEDICATIONS  (PRN):  HYDROmorphone  Injectable 0.5 milliGRAM(s) IV Push every 4 hours PRN Moderate Pain (4 - 6)  HYDROmorphone  Injectable 1 milliGRAM(s) IV Push every 4 hours PRN Severe Pain (7 - 10)  loratadine 10 milliGRAM(s) Oral daily PRN allergies  ondansetron Injectable 4 milliGRAM(s) IV Push every 6 hours PRN Nausea and/or Vomiting      Allergies    codeine (Vomiting; Nausea)  IV Contrast (Anaphylaxis)  Metamucil (Other)  Pollen/mold (Pruritus)  prochlorperazine (Other)  shellfish (Anaphylaxis)  Silk (Pruritus)  tetanus toxoid (Anaphylaxis)    Intolerances        Vital Signs Last 24 Hrs  T(C): 36.4 (26 Mar 2021 05:30), Max: 36.8 (26 Mar 2021 00:48)  T(F): 97.6 (26 Mar 2021 05:30), Max: 98.3 (26 Mar 2021 00:48)  HR: 71 (26 Mar 2021 05:30) (71 - 74)  BP: 112/76 (26 Mar 2021 05:30) (112/76 - 134/86)  BP(mean): --  RR: 18 (26 Mar 2021 05:30) (18 - 18)  SpO2: 93% (26 Mar 2021 05:30) (93% - 96%)    03-25 @ 07:01  -  03-26 @ 07:00  --------------------------------------------------------  IN: 750 mL / OUT: 0 mL / NET: 750 mL          PHYSICAL EXAM:    General: Well developed; well nourished; in no acute distress  HEENT: MMM, conjunctiva and sclera clear  H- RRR  L- CTA   Gastrointestinal: Soft, non-tender non-distended; Normal bowel sounds; No rebound or guarding  Extremities: Normal range of motion, No clubbing, cyanosis or edema  Neurological: Alert and oriented x3  Skin: Warm and dry. No obvious rash      LABS:                        11.4   3.80  )-----------( 243      ( 26 Mar 2021 07:58 )             35.2     26 Mar 2021 07:58    136    |  101    |  6.0    ----------------------------<  92     3.9     |  24.0   |  0.63     Ca    8.0        26 Mar 2021 07:58  Phos  1.2       26 Mar 2021 07:58  Mg     2.1       26 Mar 2021 07:58    TPro  5.4    /  Alb  3.1    /  TBili  0.5    /  DBili  x      /  AST  19     /  ALT  10     /  AlkPhos  66     / Amylase x      /Lipase 52     24 Mar 2021 21:25              RADIOLOGY & ADDITIONAL STUDIES:     < from: CT Abdomen and Pelvis w/ Oral Cont (03.25.21 @ 00:46) >  No bowel obstruction.    Partially imaged large fluid collection posterior to the esophagus measuring 13.5 x 7.7 cm; a chest CT is recommended for further evaluation.    Oral contrast in the distal esophagus with no contrast in the imaged portion of the collection, however oral contrast is recommended with the chest CT.    < end of copied text >

## 2021-03-26 NOTE — DISCHARGE NOTE PROVIDER - NSDCFUADDINST_GEN_ALL_CORE_FT
WOUND CARE: You will have visiting nursing set up for your drain care. Your drains should be emptied daily with the outputs recorded.   BATHING: Please do not submerge wound underwater. You may shower and/or sponge bathe.  ACTIVITY: No heavy lifting or straining. Otherwise, you may return to your usual level of physical activity. If you are taking narcotic pain medication (such as oxycodone) DO NOT drive a car, operate machinery, drink alcohol, or make important decisions.  DIET: Return to a soft diet  NOTIFY YOUR SURGEON IF: You have any bleeding that does not stop, any pus draining from your wound(s), any fever (over 100.4 F) or chills, persistent nausea/vomiting, persistent diarrhea, uncontrolled pain, concerning symptoms.   FOLLOW-UP: Please follow up with Dr. Carty in 1 week. Call for appointment upon discharge.

## 2021-03-26 NOTE — DISCHARGE NOTE NURSING/CASE MANAGEMENT/SOCIAL WORK - PATIENT PORTAL LINK FT
You can access the FollowMyHealth Patient Portal offered by [facility] by registering at the following website: [email or web address]. By joining [employer]’s FollowMyHealth portal, you will also be able to view your health information using other applications (apps) compatible with our system.

## 2021-03-26 NOTE — PROGRESS NOTE ADULT - SUBJECTIVE AND OBJECTIVE BOX
POSTOP CHECK    HPI/OVERNIGHT EVENTS:    Seen at bedside. Pt very angry and unpleased with the treatment and being 3 days in the ED. Denies n/v/f/c CP or SOB.     MEDICATIONS  (STANDING):  calcium carbonate 1250 mG  + Vitamin D (OsCal 500 + D) 1 Tablet(s) Oral daily  cholecalciferol Oral Tab/Cap - Peds 2000 Unit(s) Oral daily  cyanocobalamin 1000 MICROGram(s) Oral daily  folic acid 1 milliGRAM(s) Oral daily  gabapentin 300 milliGRAM(s) Oral two times a day  lactated ringers 1000 milliLiter(s) (125 mL/Hr) IV Continuous <Continuous>  pantoprazole  Injectable 40 milliGRAM(s) IV Push every 12 hours  senna 2 Tablet(s) Oral at bedtime  tamsulosin 0.4 milliGRAM(s) Oral two times a day    MEDICATIONS  (PRN):  HYDROmorphone  Injectable 0.5 milliGRAM(s) IV Push every 4 hours PRN Moderate Pain (4 - 6)  HYDROmorphone  Injectable 1 milliGRAM(s) IV Push every 4 hours PRN Severe Pain (7 - 10)  loratadine 10 milliGRAM(s) Oral daily PRN allergies  ondansetron Injectable 4 milliGRAM(s) IV Push every 6 hours PRN Nausea and/or Vomiting      Vital Signs Last 24 Hrs  T(C): 36.8 (26 Mar 2021 00:48), Max: 36.8 (25 Mar 2021 08:10)  T(F): 98.3 (26 Mar 2021 00:48), Max: 98.3 (26 Mar 2021 00:48)  HR: 73 (26 Mar 2021 00:48) (60 - 73)  BP: 133/79 (26 Mar 2021 00:48) (105/67 - 133/79)  BP(mean): --  RR: 18 (26 Mar 2021 00:48) (18 - 18)  SpO2: 96% (26 Mar 2021 00:48) (94% - 96%)    Constitutional: patient resting comfortably in bed, in no acute distress  HEENT: EOMI / PERRL b/l, no active drainage or redness  Neck: No JVD, full ROM without pain  Respiratory: respirations are unlabored, no accessory muscle use, no conversational dyspnea  Cardiovascular: regular rate & rhythm  Gastrointestinal: Abdomen soft, non-tender, non-distended, no rebound tenderness / guarding      I&O's Detail      LABS:                        11.1   4.08  )-----------( 246      ( 25 Mar 2021 09:39 )             34.5     03-25    136  |  104  |  12.0  ----------------------------<  95  3.5   |  22.0  |  0.55    Ca    7.8<L>      25 Mar 2021 09:39  Phos  1.3     03-25  Mg     1.7     03-25    TPro  5.4<L>  /  Alb  3.1<L>  /  TBili  0.5  /  DBili  x   /  AST  19  /  ALT  10  /  AlkPhos  66  03-24    PT/INR - ( 25 Mar 2021 09:39 )   PT: 15.0 sec;   INR: 1.31 ratio         PTT - ( 24 Mar 2021 21:25 )  PTT:36.6 sec     POSTOP CHECK    HPI/OVERNIGHT EVENTS:    Seen at bedside. Pt very angry and unpleased with the treatment and being 3 days in the ED. Denies n/v/f/c CP or SOB.   Better spirits this morning.  Seen on [address] [place].  Reports some improvement in symptoms following EGD which was essentially negative.    MEDICATIONS  (STANDING):  calcium carbonate 1250 mG  + Vitamin D (OsCal 500 + D) 1 Tablet(s) Oral daily  cholecalciferol Oral Tab/Cap - Peds 2000 Unit(s) Oral daily  cyanocobalamin 1000 MICROGram(s) Oral daily  folic acid 1 milliGRAM(s) Oral daily  gabapentin 300 milliGRAM(s) Oral two times a day  lactated ringers 1000 milliLiter(s) (125 mL/Hr) IV Continuous <Continuous>  pantoprazole  Injectable 40 milliGRAM(s) IV Push every 12 hours  senna 2 Tablet(s) Oral at bedtime  tamsulosin 0.4 milliGRAM(s) Oral two times a day    MEDICATIONS  (PRN):  HYDROmorphone  Injectable 0.5 milliGRAM(s) IV Push every 4 hours PRN Moderate Pain (4 - 6)  HYDROmorphone  Injectable 1 milliGRAM(s) IV Push every 4 hours PRN Severe Pain (7 - 10)  loratadine 10 milliGRAM(s) Oral daily PRN allergies  ondansetron Injectable 4 milliGRAM(s) IV Push every 6 hours PRN Nausea and/or Vomiting      Vital Signs Last 24 Hrs  T(C): 36.8 (26 Mar 2021 00:48), Max: 36.8 (25 Mar 2021 08:10)  T(F): 98.3 (26 Mar 2021 00:48), Max: 98.3 (26 Mar 2021 00:48)  HR: 73 (26 Mar 2021 00:48) (60 - 73)  BP: 133/79 (26 Mar 2021 00:48) (105/67 - 133/79)  BP(mean): --  RR: 18 (26 Mar 2021 00:48) (18 - 18)  SpO2: 96% (26 Mar 2021 00:48) (94% - 96%)    Constitutional: patient resting comfortably in bed, in no acute distress  HEENT: EOMI / PERRL b/l, no active drainage or redness  Neck: No JVD, full ROM without pain  Respiratory: respirations are unlabored, no accessory muscle use, no conversational dyspnea  Cardiovascular: regular rate & rhythm  Gastrointestinal: Abdomen soft, non-tender, non-distended, no rebound tenderness / guarding      I&O's Detail      LABS:                        11.1   4.08  )-----------( 246      ( 25 Mar 2021 09:39 )             34.5     03-25    136  |  104  |  12.0  ----------------------------<  95  3.5   |  22.0  |  0.55    Ca    7.8<L>      25 Mar 2021 09:39  Phos  1.3     03-25  Mg     1.7     03-25    TPro  5.4<L>  /  Alb  3.1<L>  /  TBili  0.5  /  DBili  x   /  AST  19  /  ALT  10  /  AlkPhos  66  03-24    PT/INR - ( 25 Mar 2021 09:39 )   PT: 15.0 sec;   INR: 1.31 ratio         PTT - ( 24 Mar 2021 21:25 )  PTT:36.6 sec

## 2021-03-26 NOTE — DISCHARGE NOTE PROVIDER - NSDCCPTREATMENT_GEN_ALL_CORE_FT
PRINCIPAL PROCEDURE  Procedure: Percutaneous drainage of mediastinum  Findings and Treatment:

## 2021-03-26 NOTE — DISCHARGE NOTE PROVIDER - NSDCMRMEDTOKEN_GEN_ALL_CORE_FT
acetaminophen 160 mg/5 mL oral liquid: 30 milliliter(s) orally once a day   Caltrate 600 + D oral tablet: 1 tab(s) orally 2 times a day  Claritin 10 mg oral tablet: 1 tab(s) orally once a day, As Needed  Eliquis 5 mg oral tablet: 1 tab(s) orally 2 times a day  Flexeril 5 mg oral tablet: orally once a day  folic acid 1 mg oral tablet: 1 tab(s) orally once a day  gabapentin 300 mg oral tablet: orally 2 times a day  Imodium 2 mg oral capsule: 1 cap(s) orally every 4 hours, As Needed  Levsin SL 0.125 mg sublingual tablet: 1 tab(s) sublingually every 6 hours, As Needed  gastric spasm  Lomotil 2.5 mg-0.025 mg oral tablet:   methotrexate: 8 milligram(s) orally once a day  hold for at least two weeks post op   ondansetron 4 mg oral tablet, disintegratin tab(s) orally every 6 hours   oxyCODONE 5 mg oral tablet: 1 tab(s) orally every 6 hours, As Needed  Prolia: subcutaneous every 3 months  will follow up with outpatient MD, must wait at least two weeks after surgery for next injection   tamsulosin 0.4 mg oral capsule: orally 2 times a day  Vitamin B12: orally once a day  Vitamin D3 2000 intl units (50 mcg) oral tablet: orally once a day

## 2021-03-26 NOTE — PROGRESS NOTE ADULT - ATTENDING COMMENTS
See H&P
I saw and examined the patient, and reviewed the EGD findings and data with the patient and staff  Agree with note which was also reviewed and edited where appropriate.  D/W patient, RN, residents and SPAs    Improved nausea, no vomiting post EGD.  UGI today to evaluate motility.    Zofran prn, PPI, may benefit from reglan pending UGI results.  Doubt mediastinal fluid contributing to his symptoms

## 2021-03-26 NOTE — DISCHARGE NOTE PROVIDER - CARE PROVIDER_API CALL
Darion Carty)  Surgery  [address] [place] [id number]  Phone: [phone]  Fax: [phone]  Follow Up Time:

## 2021-03-26 NOTE — PROGRESS NOTE ADULT - SUBJECTIVE AND OBJECTIVE BOX
IR PRE-PROCEDURE NOTE  DIAGNOSIS: mediastinal collection after hernia repair  PROCEDURE: CT drainage  RECENT CHANGES: None  PERTINENT LABS: Within acceptable limits.  IMAGING: Reviewed  CONSENT: On chart

## 2021-03-26 NOTE — DISCHARGE NOTE PROVIDER - HOSPITAL COURSE
3/24 - 70 year old male s/p Robotically assisted hiatal hernia repair with Vadim Fundoplication and gastropexy on 2/17/21 and more recently diagnostic laparoscopy with JOVITA of gastric adhesion causing gastric outlet obstruction now presenting with 3 days of nausea and emesis. Patient initially presented to [facility] and subsequently transferred to [facility]. Patient states that he has had abdominal discomfort , poor PO intake during the past 3 days. Last BM was 2 days ago, but passing flatus.   Patient was admitted to the surgery service, made NPO, GI was consulted for evaluation of stricture vs. reccurrence of hiatal hernia. Patient went for endoscopy on 3/26 which was negative. CT showed a mediastinal fluid collection s/p hiatal hernia repair. The patient went to IR for placement of IR drain and aspiration on 3/26. Patient tolerated well. He was advanced to a FLD, he tolerated well overnight and on 3/27 was advanced to a soft diet. Upon discharge, he is tolerating diet without nausea or vomiting, pain well controlled, HD stable, having bowel function. Per attending, is safe for discharge home with outpatient follow-up with Dr. Carty.   VNS was set up for drain care.

## 2021-03-27 VITALS
OXYGEN SATURATION: 93 % | DIASTOLIC BLOOD PRESSURE: 73 MMHG | HEART RATE: 90 BPM | TEMPERATURE: 98 F | RESPIRATION RATE: 18 BRPM | SYSTOLIC BLOOD PRESSURE: 113 MMHG

## 2021-03-27 LAB
ANION GAP SERPL CALC-SCNC: 7 MMOL/L — SIGNIFICANT CHANGE UP (ref 5–17)
BASOPHILS # BLD AUTO: 0.03 K/UL — SIGNIFICANT CHANGE UP (ref 0–0.2)
BASOPHILS NFR BLD AUTO: 0.9 % — SIGNIFICANT CHANGE UP (ref 0–2)
BUN SERPL-MCNC: 5 MG/DL — LOW (ref 8–20)
CALCIUM SERPL-MCNC: 7.7 MG/DL — LOW (ref 8.6–10.2)
CHLORIDE SERPL-SCNC: 102 MMOL/L — SIGNIFICANT CHANGE UP (ref 98–107)
CO2 SERPL-SCNC: 26 MMOL/L — SIGNIFICANT CHANGE UP (ref 22–29)
CREAT SERPL-MCNC: 0.65 MG/DL — SIGNIFICANT CHANGE UP (ref 0.5–1.3)
EOSINOPHIL # BLD AUTO: 0.06 K/UL — SIGNIFICANT CHANGE UP (ref 0–0.5)
EOSINOPHIL NFR BLD AUTO: 1.8 % — SIGNIFICANT CHANGE UP (ref 0–6)
GLUCOSE SERPL-MCNC: 96 MG/DL — SIGNIFICANT CHANGE UP (ref 70–99)
HCT VFR BLD CALC: 35.1 % — LOW (ref 39–50)
HGB BLD-MCNC: 11.4 G/DL — LOW (ref 13–17)
LYMPHOCYTES # BLD AUTO: 0.46 K/UL — LOW (ref 1–3.3)
LYMPHOCYTES # BLD AUTO: 14 % — SIGNIFICANT CHANGE UP (ref 13–44)
MAGNESIUM SERPL-MCNC: 2.1 MG/DL — SIGNIFICANT CHANGE UP (ref 1.6–2.6)
MANUAL SMEAR VERIFICATION: SIGNIFICANT CHANGE UP
MCHC RBC-ENTMCNC: 32 PG — SIGNIFICANT CHANGE UP (ref 27–34)
MCHC RBC-ENTMCNC: 32.5 GM/DL — SIGNIFICANT CHANGE UP (ref 32–36)
MCV RBC AUTO: 98.6 FL — SIGNIFICANT CHANGE UP (ref 80–100)
MONOCYTES # BLD AUTO: 0.23 K/UL — SIGNIFICANT CHANGE UP (ref 0–0.9)
MONOCYTES NFR BLD AUTO: 7 % — SIGNIFICANT CHANGE UP (ref 2–14)
NEUTROPHILS # BLD AUTO: 2.42 K/UL — SIGNIFICANT CHANGE UP (ref 1.8–7.4)
NEUTROPHILS NFR BLD AUTO: 73.7 % — SIGNIFICANT CHANGE UP (ref 43–77)
PHOSPHATE SERPL-MCNC: 2.5 MG/DL — SIGNIFICANT CHANGE UP (ref 2.4–4.7)
PLAT MORPH BLD: NORMAL — SIGNIFICANT CHANGE UP
PLATELET # BLD AUTO: 261 K/UL — SIGNIFICANT CHANGE UP (ref 150–400)
POTASSIUM SERPL-MCNC: 3.3 MMOL/L — LOW (ref 3.5–5.3)
POTASSIUM SERPL-SCNC: 3.3 MMOL/L — LOW (ref 3.5–5.3)
RBC # BLD: 3.56 M/UL — LOW (ref 4.2–5.8)
RBC # FLD: 13.1 % — SIGNIFICANT CHANGE UP (ref 10.3–14.5)
RBC BLD AUTO: NORMAL — SIGNIFICANT CHANGE UP
SODIUM SERPL-SCNC: 135 MMOL/L — SIGNIFICANT CHANGE UP (ref 135–145)
VARIANT LYMPHS # BLD: 2.6 % — SIGNIFICANT CHANGE UP (ref 0–6)
WBC # BLD: 3.29 K/UL — LOW (ref 3.8–10.5)
WBC # FLD AUTO: 3.29 K/UL — LOW (ref 3.8–10.5)

## 2021-03-27 PROCEDURE — 88305 TISSUE EXAM BY PATHOLOGIST: CPT

## 2021-03-27 PROCEDURE — 86769 SARS-COV-2 COVID-19 ANTIBODY: CPT

## 2021-03-27 PROCEDURE — 88342 IMHCHEM/IMCYTCHM 1ST ANTB: CPT

## 2021-03-27 PROCEDURE — 87070 CULTURE OTHR SPECIMN AEROBIC: CPT

## 2021-03-27 PROCEDURE — 36000 PLACE NEEDLE IN VEIN: CPT

## 2021-03-27 PROCEDURE — 83735 ASSAY OF MAGNESIUM: CPT

## 2021-03-27 PROCEDURE — 87205 SMEAR GRAM STAIN: CPT

## 2021-03-27 PROCEDURE — 84100 ASSAY OF PHOSPHORUS: CPT

## 2021-03-27 PROCEDURE — C1769: CPT

## 2021-03-27 PROCEDURE — 74176 CT ABD & PELVIS W/O CONTRAST: CPT

## 2021-03-27 PROCEDURE — 99285 EMERGENCY DEPT VISIT HI MDM: CPT | Mod: 25

## 2021-03-27 PROCEDURE — 86901 BLOOD TYPING SEROLOGIC RH(D): CPT

## 2021-03-27 PROCEDURE — 80053 COMPREHEN METABOLIC PANEL: CPT

## 2021-03-27 PROCEDURE — 77012 CT SCAN FOR NEEDLE BIOPSY: CPT

## 2021-03-27 PROCEDURE — 87075 CULTR BACTERIA EXCEPT BLOOD: CPT

## 2021-03-27 PROCEDURE — 85730 THROMBOPLASTIN TIME PARTIAL: CPT

## 2021-03-27 PROCEDURE — U0005: CPT

## 2021-03-27 PROCEDURE — 85610 PROTHROMBIN TIME: CPT

## 2021-03-27 PROCEDURE — 74019 RADEX ABDOMEN 2 VIEWS: CPT

## 2021-03-27 PROCEDURE — U0003: CPT

## 2021-03-27 PROCEDURE — 80048 BASIC METABOLIC PNL TOTAL CA: CPT

## 2021-03-27 PROCEDURE — 36415 COLL VENOUS BLD VENIPUNCTURE: CPT

## 2021-03-27 PROCEDURE — 83690 ASSAY OF LIPASE: CPT

## 2021-03-27 PROCEDURE — 86850 RBC ANTIBODY SCREEN: CPT

## 2021-03-27 PROCEDURE — 86900 BLOOD TYPING SEROLOGIC ABO: CPT

## 2021-03-27 PROCEDURE — C1729: CPT

## 2021-03-27 PROCEDURE — 71045 X-RAY EXAM CHEST 1 VIEW: CPT

## 2021-03-27 PROCEDURE — 85025 COMPLETE CBC W/AUTO DIFF WBC: CPT

## 2021-03-27 RX ORDER — POTASSIUM CHLORIDE 20 MEQ
40 PACKET (EA) ORAL ONCE
Refills: 0 | Status: DISCONTINUED | OUTPATIENT
Start: 2021-03-27 | End: 2021-03-27

## 2021-03-27 RX ORDER — POTASSIUM CHLORIDE 20 MEQ
10 PACKET (EA) ORAL
Refills: 0 | Status: DISCONTINUED | OUTPATIENT
Start: 2021-03-27 | End: 2021-03-27

## 2021-03-27 RX ORDER — POTASSIUM CHLORIDE 20 MEQ
40 PACKET (EA) ORAL ONCE
Refills: 0 | Status: COMPLETED | OUTPATIENT
Start: 2021-03-27 | End: 2021-03-27

## 2021-03-27 RX ADMIN — Medication 975 MILLIGRAM(S): at 07:15

## 2021-03-27 RX ADMIN — PREGABALIN 1000 MICROGRAM(S): 225 CAPSULE ORAL at 11:50

## 2021-03-27 RX ADMIN — OXYCODONE HYDROCHLORIDE 10 MILLIGRAM(S): 5 TABLET ORAL at 09:45

## 2021-03-27 RX ADMIN — Medication 40 MILLIEQUIVALENT(S): at 11:50

## 2021-03-27 RX ADMIN — Medication 975 MILLIGRAM(S): at 06:15

## 2021-03-27 RX ADMIN — ENOXAPARIN SODIUM 40 MILLIGRAM(S): 100 INJECTION SUBCUTANEOUS at 11:50

## 2021-03-27 RX ADMIN — Medication 15 MILLIGRAM(S): at 06:16

## 2021-03-27 RX ADMIN — Medication 1 MILLIGRAM(S): at 11:50

## 2021-03-27 RX ADMIN — Medication 1 TABLET(S): at 11:50

## 2021-03-27 RX ADMIN — PANTOPRAZOLE SODIUM 40 MILLIGRAM(S): 20 TABLET, DELAYED RELEASE ORAL at 06:14

## 2021-03-27 RX ADMIN — Medication 15 MILLIGRAM(S): at 07:15

## 2021-03-27 RX ADMIN — Medication 2000 UNIT(S): at 11:49

## 2021-03-27 RX ADMIN — GABAPENTIN 300 MILLIGRAM(S): 400 CAPSULE ORAL at 06:14

## 2021-03-27 RX ADMIN — TAMSULOSIN HYDROCHLORIDE 0.4 MILLIGRAM(S): 0.4 CAPSULE ORAL at 06:14

## 2021-03-27 RX ADMIN — SODIUM CHLORIDE 100 MILLILITER(S): 9 INJECTION, SOLUTION INTRAVENOUS at 06:14

## 2021-03-27 RX ADMIN — OXYCODONE HYDROCHLORIDE 10 MILLIGRAM(S): 5 TABLET ORAL at 08:48

## 2021-03-27 NOTE — PROGRESS NOTE ADULT - SUBJECTIVE AND OBJECTIVE BOX
HPI/OVERNIGHT EVENTS: NAEON. Patient continues to note some issues with pain control, although he does note some relief with oxycodone. S/p IR drainage of mediastinal collection, with output of about 500cc of sanguinous material. Otherwise no complaints. Tolerating full liquids; NPO at midnight for potential esophogram study today. Voiding, passing flatus and BMs. No f/c/n/v, chest pain, SOB.    MEDICATIONS  (STANDING):  acetaminophen   Tablet .. 975 milliGRAM(s) Oral every 8 hours  calcium carbonate 1250 mG  + Vitamin D (OsCal 500 + D) 1 Tablet(s) Oral daily  cholecalciferol Oral Tab/Cap - Peds 2000 Unit(s) Oral daily  cyanocobalamin 1000 MICROGram(s) Oral daily  dextrose 5% + sodium chloride 0.45%. 1000 milliLiter(s) (50 mL/Hr) IV Continuous <Continuous>  enoxaparin Injectable 40 milliGRAM(s) SubCutaneous daily  folic acid 1 milliGRAM(s) Oral daily  gabapentin 300 milliGRAM(s) Oral two times a day  ketorolac   Injectable 15 milliGRAM(s) IV Push every 6 hours  lactated ringers. 1000 milliLiter(s) (100 mL/Hr) IV Continuous <Continuous>  pantoprazole  Injectable 40 milliGRAM(s) IV Push every 12 hours  senna 2 Tablet(s) Oral at bedtime  tamsulosin 0.4 milliGRAM(s) Oral two times a day    MEDICATIONS  (PRN):  loratadine 10 milliGRAM(s) Oral daily PRN allergies  ondansetron Injectable 4 milliGRAM(s) IV Push every 6 hours PRN Nausea and/or Vomiting  oxyCODONE    IR 5 milliGRAM(s) Oral every 6 hours PRN Moderate Pain (4 - 6)  oxyCODONE    IR 10 milliGRAM(s) Oral every 4 hours PRN Severe Pain (7 - 10)      Vital Signs Last 24 Hrs  T(C): 36.8 (26 Mar 2021 22:10), Max: 36.9 (26 Mar 2021 16:44)  T(F): 98.2 (26 Mar 2021 22:10), Max: 98.4 (26 Mar 2021 16:44)  HR: 78 (26 Mar 2021 22:10) (71 - 89)  BP: 119/84 (26 Mar 2021 22:10) (112/76 - 140/98)  BP(mean): --  RR: 18 (26 Mar 2021 22:10) (18 - 18)  SpO2: 97% (26 Mar 2021 22:10) (93% - 97%)    Constitutional: patient resting comfortably in bed, in no acute distress  Respiratory: respirations are unlabored, no accessory muscle use, no conversational dyspnea  Cardiovascular: regular rate & rhythm  Gastrointestinal: Abdomen soft, non-tender, non-distended  Back: Left upper back w/mediastinal drain, with mainly serosanguinous output. Drain site c/d/i   Musculoskeletal: No joint pain, swelling or deformity; no limitation of movement      I&O's Detail    25 Mar 2021 07:01  -  26 Mar 2021 07:00  --------------------------------------------------------  IN:    Lactated Ringers w/ Additives: 750 mL  Total IN: 750 mL    OUT:  Total OUT: 0 mL    Total NET: 750 mL      26 Mar 2021 07:01  -  27 Mar 2021 04:18  --------------------------------------------------------  IN:    dextrose 5% + sodium chloride 0.45%: 200 mL    Lactated Ringers: 600 mL  Total IN: 800 mL    OUT:    Drain (mL): 150 mL    Voided (mL): 550 mL  Total OUT: 700 mL    Total NET: 100 mL          LABS:                        11.4   3.80  )-----------( 243      ( 26 Mar 2021 07:58 )             35.2     03-26    136  |  101  |  6.0<L>  ----------------------------<  92  3.9   |  24.0  |  0.63    Ca    8.0<L>      26 Mar 2021 07:58  Phos  1.2     03-26  Mg     2.1     03-26      PT/INR - ( 25 Mar 2021 09:39 )   PT: 15.0 sec;   INR: 1.31 ratio

## 2021-03-27 NOTE — PROGRESS NOTE ADULT - ASSESSMENT
A/P: 69 y/o M s/p type 4 hiatal hernia repair, followed by GOO 2/2 adhesion, now with persistent nausea and vomiting w/ food intolerance, now s/p IR drainage with drain left in place. Stable on floor.    -NPO for potential esophagram study today  -VNS for home drain care  -Encourage OOB as tolerated  -Continue to monitor diet tolerance once diet restarted

## 2021-03-29 LAB — SURGICAL PATHOLOGY STUDY: SIGNIFICANT CHANGE UP

## 2021-03-29 NOTE — CDI QUERY NOTE - NSCDIOTHERTXTBX_GEN_ALL_CORE_HH
SUPPORTING DOCUMENTATION / EVIDENCE:  Admitted with abdominal pain, nausea & vomiting found to have large mediastinal collection on CT scan  s/p recent hiatal hernia repair with Vadim Fundoplication and gastropexy on 2/17/21  EGD with bx = tortuous GE junctIon appeared normal, H.Pylori negative  CT FINDINGS :LOWER CHEST: Partially imaged large fluid collection in the mediastinum measuring 13.5 x 7.7 cm posterior to the distal esophagus   IR drainage on 3/26 = 300 cc bloody fluid, no clots, like old hematoma (see CT IR Needle Placement below)      CT ABD/PELVIS:  < from: CT Abdomen and Pelvis w/ Oral Cont (03.25.21 @ 00:46) >  IMPRESSION:  No bowel obstruction.    Partially imaged large fluid collection posterior to the esophagus measuring 13.5 x 7.7 cm; a chest CT is recommended for further evaluation.      EGD 3/25/21:  · Operative Findings  Some contrast colored fluid in the esophagus which was aspirated with normal underlying mucosa. The esophagus was slightly tortuous GE junction appeared normal. Small amount of vegetable matter in the prepyloric area. Normal post op change in the cardia. Fair amount of contrast colored fluid in the fundus which was aspirated as well. Multiple circular areas of subepithelial ecchymosis c/w NG suction artifact. No other abnormalities      IR CT Guided Needle Placement 3/26/21:   INTERPRETATION:  History: Patient status post hernia repair with nausea and vomiting. CT shows a fluid collection in the mediastinum which exerts mass effect on the esophagus. Patient is here for drainage of the fluid collection        Can the etiology of the abdominal pain and any relationship to recent hiatal hernia surgery be clarified after study?  - Abdominal pain suspected due to unspecified mediastinal disease (fluid collection) that was likely a complication of recent hernia surgery  - Abdominal pain suspected due to unspecified mediastinal disease (fluid collection) that was not a complication of recent hernial surgery  - Abdominal pain likely due to other (please specify)  - Unable to determine etiology of abdominal pain

## 2021-03-29 NOTE — CDI QUERY NOTE - NSCDIOTHERTXTBX2_GEN_ALL_CORE_FT
Clinical documentation indicates that this patient has atrial fibrillation.  Please further specify:    SUPPORTING DOCUMENTATION AND/OR CLINICAL EVIDENCE:  see above  patient with history of afib on Eliquis      Please clarify, in addendum to dc summary, the likely type of afib patient is treated for.   - chronic afib   - permanent afib   - persistent afib   - other   - unknown type of afib    Thank you

## 2021-03-29 NOTE — CHART NOTE - NSCHARTNOTEFT_GEN_A_CORE
s/p mediastinal drain placement performed by IR    Patient seen and evaluated at bedside. Patient is comfortable, pain well controlled. mild nausea, but improved since admission. He has voided and had a BM.    ICU Vital Signs Last 24 Hrs  T(C): 36.3 (26 Mar 2021 13:01), Max: 36.8 (26 Mar 2021 00:48)  T(F): 97.4 (26 Mar 2021 13:01), Max: 98.3 (26 Mar 2021 00:48)  HR: 80 (26 Mar 2021 13:01) (71 - 80)  BP: 140/98 (26 Mar 2021 13:01) (112/76 - 140/98)  BP(mean): --  ABP: --  ABP(mean): --  RR: 18 (26 Mar 2021 13:01) (18 - 18)  SpO2: 95% (26 Mar 2021 13:01) (93% - 96%)      physical exam:  gen: nad, a&ox3  cv: irregularly irregular  resp: nonlabored breathing  back: left upper back w/ mediastinal drain in place w/ 150 serosang. clean dry intact   gi: soft, nd, nttp  msk: no c/c/e      A/P:  70yoM s/p type 4 hiatal hernia repair, then w/ GOO 2/2 adhesion, and now with persistent nausea and vomiting w/ food intolerance. EGD showed tortuous esophagus likely 2/2 from mass effect from mediastinal fluid collection. Now has been drained w/ IR w/ drain left in place  - Start FLD  - diet tolerance  - dvt ppx  - drain output  - VNS for home drain care  - oob, IS use
Please note upon review of chart the following diagnosis exists for this patient:  1.  Abdominal pain suspected due to unspecified mediastinal disease (fluid collection) that was not a complication of recent hernial surgery    2. Chronic afib    ETIOLOGY OF ABD PAIN.     SUPPORTING DOCUMENTATION / EVIDENCE:  Admitted with abdominal pain, nausea & vomiting found to have large mediastinal collection on CT scan  s/p recent hiatal hernia repair with Vadim Fundoplication and gastropexy on 2/17/21  EGD with bx = tortuous GE junctIon appeared normal, H.Pylori negative  CT FINDINGS :LOWER CHEST: Partially imaged large fluid collection in the mediastinum measuring 13.5 x 7.7 cm posterior to the distal esophagus   IR drainage on 3/26 = 300 cc bloody fluid, no clots, like old hematoma (see CT IR Needle Placement below)      CT ABD/PELVIS:  < from: CT Abdomen and Pelvis w/ Oral Cont (03.25.21 @ 00:46) >  IMPRESSION:  No bowel obstruction.    Partially imaged large fluid collection posterior to the esophagus measuring 13.5 x 7.7 cm; a chest CT is recommended for further evaluation.      EGD 3/25/21:  · Operative Findings  Some contrast colored fluid in the esophagus which was aspirated with normal underlying mucosa. The esophagus was slightly tortuous GE junction appeared normal. Small amount of vegetable matter in the prepyloric area. Normal post op change in the cardia. Fair amount of contrast colored fluid in the fundus which was aspirated as well. Multiple circular areas of subepithelial ecchymosis c/w NG suction artifact. No other abnormalities      IR CT Guided Needle Placement 3/26/21:   INTERPRETATION:  History: Patient status post hernia repair with nausea and vomiting. CT shows a fluid collection in the mediastinum which exerts mass effect on the esophagus. Patient is here for drainage of the fluid collection        SUPPORTING DOCUMENTATION AND/OR CLINICAL EVIDENCE:  see above  patient with history of afib on Eliquis

## 2021-03-31 ENCOUNTER — NON-APPOINTMENT (OUTPATIENT)
Age: 71
End: 2021-03-31

## 2021-03-31 LAB
CULTURE RESULTS: SIGNIFICANT CHANGE UP
SPECIMEN SOURCE: SIGNIFICANT CHANGE UP

## 2021-04-01 ENCOUNTER — APPOINTMENT (OUTPATIENT)
Dept: SURGERY | Facility: CLINIC | Age: 71
End: 2021-04-01
Payer: MEDICARE

## 2021-04-01 VITALS
TEMPERATURE: 97.3 F | SYSTOLIC BLOOD PRESSURE: 99 MMHG | HEART RATE: 71 BPM | OXYGEN SATURATION: 97 % | DIASTOLIC BLOOD PRESSURE: 66 MMHG

## 2021-04-01 PROCEDURE — 99024 POSTOP FOLLOW-UP VISIT: CPT

## 2021-04-01 NOTE — PLAN
[FreeTextEntry1] : Continue soft diet\par Drain study on Monday\par Discussed with Dr. Velazco\par Follow-up 2 weeks

## 2021-04-01 NOTE — ASSESSMENT
[FreeTextEntry1] : 2 months status post type IV paraesophageal hernia repair with mesh requiring mediastinal drainage doing well post drainage.\par

## 2021-04-01 NOTE — HISTORY OF PRESENT ILLNESS
[de-identified] : Mr. JAVIER CARBAJAL is a 70 year-old man with history of robot-assisted hiatal hernia repair with Vadim Fundoplication and gastropexy & more recently a diagnostic laparoscopy with lysis of adhesions of gastric adhesions. Admitted to [facility] on 3/24/2021 and transferred [facility] with 3 days of post operative nausea and vomiting. Found to have a mediastinal fluid collection on CT, s/p IR placed left upper back, here for a post procedure/hospitalization visit. As per Dylan najera, patient's drain collects ~75ml of serosanguineous daily. Patient reports no dysphagia with soft foods po. Denies fever, chills.

## 2021-04-01 NOTE — PHYSICAL EXAM
[Normal Thyroid] : the thyroid was normal [Normal Breath Sounds] : Normal breath sounds [Normal Heart Sounds] : normal heart sounds [Normal Rate and Rhythm] : normal rate and rhythm [No Rash or Lesion] : No rash or lesion [Alert] : alert [Oriented to Person] : oriented to person [Oriented to Place] : oriented to place [Oriented to Time] : oriented to time [Calm] : calm [JVD] : no jugular venous distention  [Purpura] : no purpura  [Petechiae] : no petechiae [Skin Ulcer] : no ulcer [Skin Induration] : no induration [de-identified] : NAD [de-identified] : anicteric PERSABIHALA [de-identified] : drain to left posterior chest wall intact and draining serosanguineous fluid [de-identified] : abdomen soft nontender nondistended\par Drain in place with serous output [de-identified] : grossly intact

## 2021-04-05 ENCOUNTER — OUTPATIENT (OUTPATIENT)
Dept: OUTPATIENT SERVICES | Facility: HOSPITAL | Age: 71
LOS: 1 days | End: 2021-04-05
Payer: MEDICARE

## 2021-04-05 ENCOUNTER — RESULT REVIEW (OUTPATIENT)
Age: 71
End: 2021-04-05

## 2021-04-05 DIAGNOSIS — Z98.890 OTHER SPECIFIED POSTPROCEDURAL STATES: Chronic | ICD-10-CM

## 2021-04-05 DIAGNOSIS — Z90.89 ACQUIRED ABSENCE OF OTHER ORGANS: Chronic | ICD-10-CM

## 2021-04-05 DIAGNOSIS — R18.8 OTHER ASCITES: ICD-10-CM

## 2021-04-05 PROCEDURE — 71045 X-RAY EXAM CHEST 1 VIEW: CPT | Mod: 26

## 2021-04-05 PROCEDURE — 74150 CT ABDOMEN W/O CONTRAST: CPT | Mod: 26,MH

## 2021-04-05 PROCEDURE — 76000 FLUOROSCOPY <1 HR PHYS/QHP: CPT

## 2021-04-05 PROCEDURE — 32552 REMOVE LUNG CATHETER: CPT

## 2021-04-05 PROCEDURE — C1769: CPT

## 2021-04-05 PROCEDURE — 99212 OFFICE O/P EST SF 10 MIN: CPT

## 2021-04-05 PROCEDURE — 74150 CT ABDOMEN W/O CONTRAST: CPT

## 2021-04-05 PROCEDURE — 71045 X-RAY EXAM CHEST 1 VIEW: CPT

## 2021-04-05 NOTE — ASU DISCHARGE PLAN (ADULT/PEDIATRIC) - CALL YOUR DOCTOR IF YOU HAVE ANY OF THE FOLLOWING:
Chest pain or shortness of breath, go to nearest emergency room/Bleeding that does not stop/Fever greater than (need to indicate Fahrenheit or Celsius)/Wound/Surgical Site with redness, or foul smelling discharge or pus

## 2021-04-05 NOTE — PROCEDURE NOTE - PROCEDURE FINDINGS AND DETAILS
CT showed no significant residual collection.    under fluoro, tube reomved over a bentson wire with no complication

## 2021-04-05 NOTE — PROCEDURE NOTE - NSINFORMCONSENT_GEN_A_CORE
Benefits, risks, and possible complications of procedure explained to patient/caregiver who verbalized understanding and gave written consent.

## 2021-04-09 RX ORDER — HYOSCYAMINE SULFATE 0.12 MG/1
0.12 TABLET, ORALLY DISINTEGRATING ORAL
Qty: 30 | Refills: 0 | Status: ACTIVE | COMMUNITY
Start: 2021-04-09 | End: 1900-01-01

## 2021-04-13 ENCOUNTER — APPOINTMENT (OUTPATIENT)
Dept: FAMILY MEDICINE | Facility: CLINIC | Age: 71
End: 2021-04-13

## 2021-04-15 ENCOUNTER — APPOINTMENT (OUTPATIENT)
Dept: SURGERY | Facility: CLINIC | Age: 71
End: 2021-04-15

## 2021-04-22 ENCOUNTER — APPOINTMENT (OUTPATIENT)
Dept: SURGERY | Facility: CLINIC | Age: 71
End: 2021-04-22
Payer: MEDICARE

## 2021-04-22 VITALS — HEART RATE: 83 BPM | OXYGEN SATURATION: 93 % | TEMPERATURE: 97.3 F

## 2021-04-22 DIAGNOSIS — Z87.19 OTHER SPECIFIED POSTPROCEDURAL STATES: ICD-10-CM

## 2021-04-22 DIAGNOSIS — Z98.890 OTHER SPECIFIED POSTPROCEDURAL STATES: ICD-10-CM

## 2021-04-22 PROCEDURE — 99024 POSTOP FOLLOW-UP VISIT: CPT

## 2021-04-22 NOTE — HISTORY OF PRESENT ILLNESS
[de-identified] : Mr. JAVIER CARBAJAL is a 70 year-old man with history of Robot-assisted Hiatal Hernia Repair with Vadim Fundoplication and Gastropexy & more recently a diagnostic laparoscopy with lysis of adhesions of gastric adhesions. Admitted to [facility] on 3/24/2021 and transferred [facility] with 3 days of post operative nausea and vomiting. Found to have a mediastinal fluid collection on CT, s/p IR placed left upper back, here today s/p removal of mediastinal drain on 4/5/2021.\par Today reports burping with bad taste. Reports period of nausea, relieved with zofran.  Tolerating a diet but resumed bread although advised against.  Patient feels quite well today but was uncomfortable last night.

## 2021-04-22 NOTE — ASSESSMENT
[FreeTextEntry1] : Status post type IV paraesophageal hernia repair with an episode yesterday of "sour burps" otherwise doing quite well status post drainage of mediastinal collection.  Rule out recurrent collection

## 2021-04-22 NOTE — PHYSICAL EXAM
[Normal Thyroid] : the thyroid was normal [Normal Breath Sounds] : Normal breath sounds [Normal Heart Sounds] : normal heart sounds [Normal Rate and Rhythm] : normal rate and rhythm [No Rash or Lesion] : No rash or lesion [Alert] : alert [Oriented to Person] : oriented to person [Oriented to Place] : oriented to place [Oriented to Time] : oriented to time [Calm] : calm [JVD] : no jugular venous distention  [Purpura] : no purpura  [Petechiae] : no petechiae [Skin Ulcer] : no ulcer [Skin Induration] : no induration [de-identified] : NAD [de-identified] : anicteric, mucous membranes moist  [de-identified] : old chest drain site CDI [de-identified] : abdomen soft non tender nondistended  [de-identified] : grossly intact; uses walking devices

## 2021-04-22 NOTE — PLAN
[FreeTextEntry1] : Continue antacid treatment\par Avoid bread and carbonation\par Check follow-up CT scan\par If CT normal follow-up with GI

## 2021-04-26 ENCOUNTER — APPOINTMENT (OUTPATIENT)
Dept: FAMILY MEDICINE | Facility: CLINIC | Age: 71
End: 2021-04-26

## 2021-05-06 ENCOUNTER — APPOINTMENT (OUTPATIENT)
Dept: SURGERY | Facility: CLINIC | Age: 71
End: 2021-05-06
Payer: MEDICARE

## 2021-05-06 VITALS
HEIGHT: 76 IN | DIASTOLIC BLOOD PRESSURE: 72 MMHG | TEMPERATURE: 97.3 F | OXYGEN SATURATION: 93 % | HEART RATE: 92 BPM | SYSTOLIC BLOOD PRESSURE: 104 MMHG | WEIGHT: 178 LBS | BODY MASS INDEX: 21.68 KG/M2

## 2021-05-06 PROCEDURE — 99024 POSTOP FOLLOW-UP VISIT: CPT

## 2021-05-06 NOTE — PLAN
[FreeTextEntry1] : Follow-up as needed\par Continue post hiatal hernia repair diet\par Follow-up care per GI

## 2021-05-06 NOTE — PHYSICAL EXAM
[de-identified] : NAD [de-identified] : Normoactive bowel sounds, no hepatosplenomegaly, no masses, non-tender.  Healed wounds nontender

## 2021-05-06 NOTE — ASSESSMENT
[FreeTextEntry1] : Status post paraesophageal hernia repair and mediastinal drainage with significant improvement in symptoms which are now completely resolved

## 2021-05-06 NOTE — HISTORY OF PRESENT ILLNESS
[de-identified] : Mr. JAVIER CARBAJAL is a 70 year-old man with history of Robot-assisted Hiatal Hernia Repair with Vadim Fundoplication and Gastropexy (done 2/17/2021) & more recently a diagnostic laparoscopy with lysis of adhesions of gastric adhesions (done 3/18/2021). Here today to go over the results of the most recent CT done at [facility].  Denies nausea/vomiting, reports mild acid reflux "every once in awhile". Mild burping continues.  Experimenting with non paraesophageal hernia repair diet.\par Overall satisfied with his current condition

## 2021-07-15 ENCOUNTER — APPOINTMENT (OUTPATIENT)
Dept: FAMILY MEDICINE | Facility: CLINIC | Age: 71
End: 2021-07-15
Payer: MEDICARE

## 2021-07-15 ENCOUNTER — LABORATORY RESULT (OUTPATIENT)
Age: 71
End: 2021-07-15

## 2021-07-15 VITALS
HEART RATE: 83 BPM | HEIGHT: 76.5 IN | RESPIRATION RATE: 16 BRPM | WEIGHT: 175 LBS | SYSTOLIC BLOOD PRESSURE: 126 MMHG | DIASTOLIC BLOOD PRESSURE: 80 MMHG | OXYGEN SATURATION: 96 % | BODY MASS INDEX: 21.09 KG/M2

## 2021-07-15 VITALS — TEMPERATURE: 98.5 F

## 2021-07-15 DIAGNOSIS — Z13.220 ENCOUNTER FOR SCREENING FOR LIPOID DISORDERS: ICD-10-CM

## 2021-07-15 PROCEDURE — 36415 COLL VENOUS BLD VENIPUNCTURE: CPT

## 2021-07-15 PROCEDURE — 99214 OFFICE O/P EST MOD 30 MIN: CPT | Mod: 25

## 2021-07-17 PROBLEM — Z13.220 LIPID SCREENING: Status: ACTIVE | Noted: 2021-07-15

## 2021-07-17 NOTE — HISTORY OF PRESENT ILLNESS
[FreeTextEntry1] : Patient presents for follow up \par  [de-identified] : Mr. JAVIER CARBAJAL is a 70 year old male who presents to the office for follow-up. \par S/p abdominal surgeries (paraesophageal hernia repair). Doing well. \par Tolerating PO diet and liquids, denies abdominal pain, N/V/D.\par Hx of Afib- on Eliquis. Follows Cardio- Dr. Manuel. Will f/u appt 7/26/21\par Ambulates with single cane. No recent falls. \par Follows [facility] for Prostrate CA\par Has not other complaints today. \par \par HCM: \par [place]: 2017, due 2022. \par

## 2021-07-17 NOTE — COUNSELING
[Fall prevention counseling provided] : Fall prevention counseling provided [Adequate lighting] : Adequate lighting [No throw rugs] : No throw rugs [Use proper foot wear] : Use proper foot wear [Behavioral health counseling provided] : Behavioral health counseling provided [Sleep ___ hours/day] : Sleep [unfilled] hours/day [Engage in a relaxing activity] : Engage in a relaxing activity [Plan in advance] : Plan in advance [None] : None [Good understanding] : Patient has a good understanding of lifestyle changes and steps needed to achieve self management goal [FreeTextEntry1] : ambulates with single cane.

## 2021-07-17 NOTE — PHYSICAL EXAM
[Pedal Pulses Present] : the pedal pulses are present [No Edema] : there was no peripheral edema [No Extremity Clubbing/Cyanosis] : no extremity clubbing/cyanosis [Soft] : abdomen soft [Non Tender] : non-tender [Non-distended] : non-distended [Normal Bowel Sounds] : normal bowel sounds [No Spinal Tenderness] : no spinal tenderness [Grossly Normal Strength/Tone] : grossly normal strength/tone [Coordination Grossly Intact] : coordination grossly intact [Normal] : affect was normal and insight and judgment were intact [de-identified] : j [de-identified] : unsteady gait,

## 2021-07-17 NOTE — REVIEW OF SYSTEMS
[Joint Pain] : joint pain [Joint Stiffness] : joint stiffness [Back Pain] : back pain [Unsteady Walk] : ataxia [Negative] : Psychiatric [Dysuria] : no dysuria [Hematuria] : no hematuria [Joint Swelling] : no joint swelling [Skin Rash] : no skin rash [Headache] : no headache [Dizziness] : no dizziness [Memory Loss] : no memory loss [de-identified] : b

## 2021-07-19 LAB
ALBUMIN SERPL ELPH-MCNC: 4 G/DL
ALP BLD-CCNC: 80 U/L
ALT SERPL-CCNC: 17 U/L
ANION GAP SERPL CALC-SCNC: 11 MMOL/L
AST SERPL-CCNC: 24 U/L
BASOPHILS # BLD AUTO: 0.03 K/UL
BASOPHILS NFR BLD AUTO: 0.7 %
BILIRUB SERPL-MCNC: 0.4 MG/DL
BUN SERPL-MCNC: 14 MG/DL
CALCIUM SERPL-MCNC: 9.8 MG/DL
CHLORIDE SERPL-SCNC: 101 MMOL/L
CHOLEST SERPL-MCNC: 184 MG/DL
CO2 SERPL-SCNC: 28 MMOL/L
CREAT SERPL-MCNC: 0.92 MG/DL
EOSINOPHIL # BLD AUTO: 0.11 K/UL
EOSINOPHIL NFR BLD AUTO: 2.5 %
GLUCOSE SERPL-MCNC: 129 MG/DL
HCT VFR BLD CALC: 43.3 %
HDLC SERPL-MCNC: 56 MG/DL
HGB BLD-MCNC: 13.7 G/DL
IMM GRANULOCYTES NFR BLD AUTO: 0.2 %
LDLC SERPL CALC-MCNC: 96 MG/DL
LYMPHOCYTES # BLD AUTO: 0.82 K/UL
LYMPHOCYTES NFR BLD AUTO: 18.4 %
MAN DIFF?: NORMAL
MCHC RBC-ENTMCNC: 31.6 GM/DL
MCHC RBC-ENTMCNC: 33.5 PG
MCV RBC AUTO: 105.9 FL
MONOCYTES # BLD AUTO: 0.32 K/UL
MONOCYTES NFR BLD AUTO: 7.2 %
NEUTROPHILS # BLD AUTO: 3.17 K/UL
NEUTROPHILS NFR BLD AUTO: 71 %
NONHDLC SERPL-MCNC: 128 MG/DL
PLATELET # BLD AUTO: 198 K/UL
POTASSIUM SERPL-SCNC: 4.3 MMOL/L
PROT SERPL-MCNC: 6.2 G/DL
RBC # BLD: 4.09 M/UL
RBC # FLD: 14.2 %
SODIUM SERPL-SCNC: 140 MMOL/L
T3 SERPL-MCNC: 94 NG/DL
T4 SERPL-MCNC: 7.7 UG/DL
TRIGL SERPL-MCNC: 158 MG/DL
TSH SERPL-ACNC: 1.7 UIU/ML
WBC # FLD AUTO: 4.46 K/UL

## 2021-07-22 ENCOUNTER — NON-APPOINTMENT (OUTPATIENT)
Age: 71
End: 2021-07-22

## 2021-10-01 ENCOUNTER — APPOINTMENT (OUTPATIENT)
Dept: FAMILY MEDICINE | Facility: CLINIC | Age: 71
End: 2021-10-01
Payer: MEDICARE

## 2021-10-01 PROCEDURE — 99443: CPT | Mod: 95

## 2021-10-01 NOTE — PLAN
[FreeTextEntry1] : 71 yr old male acute visit \par \par \par Cough secondary to prior COVID Pneumonia: \par take Tessalon Perles as directed \par start rescue inhaler as needed for SOB and wheezing \par f/u with cardio \par may need referral to Pulm if symptoms not improved \par I reviewed prior CXR and CT chest \par \par continue all medications as directed \par RTO as routine for follow

## 2021-10-01 NOTE — HISTORY OF PRESENT ILLNESS
[Home] : at home, [unfilled] , at the time of the visit. [Medical Office: ([facility])___] : at the medical office located in  [Verbal consent obtained from patient] : the patient, [unfilled] [FreeTextEntry8] : Verbal consent was obtained from patient for telephonic visit on 10/1/2021 \par \par States 12 days ago he had COVID and was given monoclonal antibodies at [place]. He is having a lingering cough. Denies fever, chills. \par

## 2021-10-01 NOTE — END OF VISIT
[Time Spent: ___ minutes] : I have spent [unfilled] minutes of time on the encounter. [FreeTextEntry3] : 25 minutes was spent with patient. Extensive discussion regarding medical compliance with medications, healthy lifestyle and importance of behavioral health.\par

## 2021-12-07 ENCOUNTER — NON-APPOINTMENT (OUTPATIENT)
Age: 71
End: 2021-12-07

## 2021-12-13 ENCOUNTER — NON-APPOINTMENT (OUTPATIENT)
Age: 71
End: 2021-12-13

## 2021-12-16 ENCOUNTER — NON-APPOINTMENT (OUTPATIENT)
Age: 71
End: 2021-12-16

## 2022-03-22 ENCOUNTER — NON-APPOINTMENT (OUTPATIENT)
Age: 72
End: 2022-03-22

## 2022-03-28 ENCOUNTER — APPOINTMENT (OUTPATIENT)
Dept: FAMILY MEDICINE | Facility: CLINIC | Age: 72
End: 2022-03-28
Payer: MEDICARE

## 2022-03-28 VITALS
HEIGHT: 76.5 IN | SYSTOLIC BLOOD PRESSURE: 124 MMHG | WEIGHT: 179 LBS | DIASTOLIC BLOOD PRESSURE: 82 MMHG | HEART RATE: 74 BPM | RESPIRATION RATE: 15 BRPM | BODY MASS INDEX: 21.57 KG/M2 | OXYGEN SATURATION: 98 %

## 2022-03-28 VITALS — TEMPERATURE: 96.4 F

## 2022-03-28 PROCEDURE — 99496 TRANSJ CARE MGMT HIGH F2F 7D: CPT

## 2022-03-28 NOTE — HISTORY OF PRESENT ILLNESS
[Post-hospitalization from ___ Hospital] : Post-hospitalization from [unfilled] Hospital [Discharged on ___] : discharged on [unfilled] [FreeTextEntry2] : 71 yr old male is here for HFU. Reports he is feeling well at this time. Denies changes in vision, dizziness, chest pain, palpitations and lower extremity edema.\par

## 2022-03-28 NOTE — REVIEW OF SYSTEMS
[Negative] : Heme/Lymph [Joint Pain] : joint pain [Joint Stiffness] : no joint stiffness [Muscle Pain] : no muscle pain [Muscle Weakness] : no muscle weakness [Back Pain] : no back pain [Joint Swelling] : no joint swelling

## 2022-03-28 NOTE — PLAN
[FreeTextEntry1] : 71 yr old male HFU \par \par reviewed most recent hospitalization, labs and diagnostics \par continue all medications as directed \par healthy diet and physical activity as tolerated\par fall precautions \par f/u with oncology, GI and cardio \par RTO as routine for follow up. \par

## 2022-03-28 NOTE — PHYSICAL EXAM
[No Acute Distress] : no acute distress [Normal Sclera/Conjunctiva] : normal sclera/conjunctiva [Normal Outer Ear/Nose] : the outer ears and nose were normal in appearance [No JVD] : no jugular venous distention [No Respiratory Distress] : no respiratory distress  [No Accessory Muscle Use] : no accessory muscle use [Normal Rate] : normal rate  [Regular Rhythm] : with a regular rhythm [Normal S1, S2] : normal S1 and S2 [No Murmur] : no murmur heard [Kyphosis] : kyphosis [Normal Affect] : the affect was normal [Alert and Oriented x3] : oriented to person, place, and time [de-identified] : trace wheezing noted  [de-identified] : trace ankle edema  [de-identified] : uses cane

## 2022-03-28 NOTE — COUNSELING
[Fall prevention counseling provided] : Fall prevention counseling provided [Adequate lighting] : Adequate lighting [No throw rugs] : No throw rugs [Use proper foot wear] : Use proper foot wear [Use recommended devices] : Use recommended devices [Encouraged to increase physical activity] : Encouraged to increase physical activity

## 2022-04-06 DIAGNOSIS — Z13.29 ENCOUNTER FOR SCREENING FOR OTHER SUSPECTED ENDOCRINE DISORDER: ICD-10-CM

## 2022-04-06 DIAGNOSIS — Z98.890 OTHER SPECIFIED POSTPROCEDURAL STATES: ICD-10-CM

## 2022-05-09 ENCOUNTER — NON-APPOINTMENT (OUTPATIENT)
Age: 72
End: 2022-05-09

## 2022-05-10 ENCOUNTER — APPOINTMENT (OUTPATIENT)
Dept: FAMILY MEDICINE | Facility: CLINIC | Age: 72
End: 2022-05-10
Payer: MEDICARE

## 2022-05-10 VITALS
HEART RATE: 74 BPM | DIASTOLIC BLOOD PRESSURE: 80 MMHG | SYSTOLIC BLOOD PRESSURE: 118 MMHG | OXYGEN SATURATION: 98 % | BODY MASS INDEX: 21.8 KG/M2 | RESPIRATION RATE: 15 BRPM | HEIGHT: 76 IN | TEMPERATURE: 98.1 F | WEIGHT: 179 LBS

## 2022-05-10 VITALS — TEMPERATURE: 98.1 F

## 2022-05-10 DIAGNOSIS — M94.0 CHONDROCOSTAL JUNCTION SYNDROME [TIETZE]: ICD-10-CM

## 2022-05-10 DIAGNOSIS — S20.219A CONTUSION OF UNSPECIFIED FRONT WALL OF THORAX, INITIAL ENCOUNTER: ICD-10-CM
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
## 2022-05-10 DIAGNOSIS — Z86.16 PERSONAL HISTORY OF COVID-19: ICD-10-CM

## 2022-05-10 PROCEDURE — 99214 OFFICE O/P EST MOD 30 MIN: CPT

## 2022-05-10 RX ORDER — OXYCODONE 5 MG/1
5 TABLET ORAL
Qty: 15 | Refills: 0 | Status: DISCONTINUED | COMMUNITY
Start: 2019-03-06 | End: 2022-05-10

## 2022-05-10 RX ORDER — TAMSULOSIN HYDROCHLORIDE 0.4 MG/1
0.4 CAPSULE ORAL
Refills: 0 | Status: ACTIVE | COMMUNITY
Start: 2018-03-21

## 2022-05-10 RX ORDER — BENZONATATE 100 MG/1
100 CAPSULE ORAL 3 TIMES DAILY
Qty: 30 | Refills: 1 | Status: DISCONTINUED | COMMUNITY
Start: 2021-10-01 | End: 2022-05-10

## 2022-05-10 NOTE — PHYSICAL EXAM
[Ill-Appearing] : ill-appearing [Cachectic] : cachexia was observed [No Respiratory Distress] : no respiratory distress  [No Accessory Muscle Use] : no accessory muscle use [Clear to Auscultation] : lungs were clear to auscultation bilaterally [Normal Rate] : normal rate  [Regular Rhythm] : with a regular rhythm [Normal S1, S2] : normal S1 and S2 [Soft] : abdomen soft [Non-distended] : non-distended [de-identified] : he was doubled over in pain [de-identified] : tenderness on palpation of epigastric area [de-identified] : he was anxious due to the pain

## 2022-05-10 NOTE — REVIEW OF SYSTEMS
[Fever] : no fever [Fatigue] : fatigue [Abdominal Pain] : abdominal pain [Nausea] : nausea [Constipation] : constipation [Diarrhea] : diarrhea [Vomiting] : vomiting [Negative] : Heme/Lymph [de-identified] : son reports confusion

## 2022-05-10 NOTE — HISTORY OF PRESENT ILLNESS
[FreeTextEntry8] : Patient presents with severe abdominal pain,nausea since Thursday. Has been to [facility] 3 times since Thursday. Was admitted Saturday and left Sunday against medical advice. Son states that his father is slightly confused as well.  He reports that the pain is in the left upper abdomen and the pain is constant.  He has had persistent nausea and vomiting.  He was at the ER again last night and had lab testing done and a CT scan.  He received IV fluids and some potassium infusions.  He reports that his last bowel movement was 3-4 days ago.  He hasn't been able to take his medications due to the pain and "dry heaving".. He reports that his pain is 8/10 now.  He last saw his GI provider 1 month ago for gastritis but this pain is new.  He was seen in the presence of his son

## 2022-05-10 NOTE — PLAN
[FreeTextEntry1] : the ER report including CT and lab reports was reviewed, unfortunately his symptoms haven't improved and he is unable to keep any fluids or medications down and he was referred back to the ER, his son declined having an ambulance called and will drive him there now

## 2022-06-15 NOTE — DIETITIAN NUTRITION RISK NOTIFICATION - PHYSICAL ASSESSMENT SHOULDERS
Procedure(s):  DIAGNOSTIC LAPAROSCOPY, DISTAL PANCREATECTOMY, SPLENECTOMY. general    Anesthesia Post Evaluation      Multimodal analgesia: multimodal analgesia used between 6 hours prior to anesthesia start to PACU discharge  Patient location during evaluation: PACU  Patient participation: complete - patient participated  Level of consciousness: awake and alert  Pain management: adequate  Airway patency: patent  Anesthetic complications: no  Cardiovascular status: acceptable, hemodynamically stable and blood pressure returned to baseline  Respiratory status: acceptable and nasal cannula  Hydration status: euvolemic  Post anesthesia nausea and vomiting:  none  Final Post Anesthesia Temperature Assessment:  Normothermia (36.0-37.5 degrees C)      INITIAL Post-op Vital signs:   Vitals Value Taken Time   /64 06/15/22 1643   Temp 36.5 °C (97.7 °F) 06/15/22 1538   Pulse 105 06/15/22 1644   Resp 20 06/15/22 1644   SpO2 98 % 06/15/22 1644   Vitals shown include unvalidated device data.
severe

## 2022-06-28 ENCOUNTER — APPOINTMENT (OUTPATIENT)
Dept: FAMILY MEDICINE | Facility: CLINIC | Age: 72
End: 2022-06-28

## 2022-06-28 VITALS
SYSTOLIC BLOOD PRESSURE: 126 MMHG | WEIGHT: 179 LBS | DIASTOLIC BLOOD PRESSURE: 84 MMHG | OXYGEN SATURATION: 100 % | RESPIRATION RATE: 15 BRPM | HEART RATE: 66 BPM | HEIGHT: 76 IN | BODY MASS INDEX: 21.8 KG/M2

## 2022-06-28 DIAGNOSIS — Z87.81 PERSONAL HISTORY OF (HEALED) TRAUMATIC FRACTURE: ICD-10-CM

## 2022-06-28 DIAGNOSIS — R18.8 OTHER ASCITES: ICD-10-CM

## 2022-06-28 DIAGNOSIS — Z87.898 PERSONAL HISTORY OF OTHER SPECIFIED CONDITIONS: ICD-10-CM

## 2022-06-28 DIAGNOSIS — R10.9 UNSPECIFIED ABDOMINAL PAIN: ICD-10-CM

## 2022-06-28 DIAGNOSIS — R10.13 EPIGASTRIC PAIN: ICD-10-CM

## 2022-06-28 DIAGNOSIS — R14.2 ERUCTATION: ICD-10-CM

## 2022-06-28 PROCEDURE — 99215 OFFICE O/P EST HI 40 MIN: CPT | Mod: 25

## 2022-06-28 NOTE — HISTORY OF PRESENT ILLNESS
[FreeTextEntry1] : Patient presents for medication renewal  [de-identified] : 71 yr old male is here for follow up. He is now taking simethicone with relief of bloating. He admits to chronic headaches since 5 yrs of age. Reports headaches that occur 2 times a month centralized over left eye. He is eating well at this time. Denies changes in vision, dizziness, chest pain, palpitations, melena, hematuria and lower extremity edema.\par

## 2022-06-28 NOTE — PLAN
[FreeTextEntry1] : 71 yr old male follow up. \par \par 1. Prostate cancer with mets to sacrum/pelvis: \par Lupron per Clarke K \par recent labs reports normal ( will request) \par f/u with oncologist as directed \par \par 2. abdominal bloating: \par tolerating diet at this time \par simethicone as directed \par \par 3. RA and OP: \par f/u with rheum as directed \par \par 4. Afib: \par continue Eliquis\par f/u with cardio \par fall precautions advised \par \par given referral to neuro for further evaluation of chronic migraines \par continue all medications as directed \par RTO as routine for follow up.\par  \par \par

## 2022-09-28 ENCOUNTER — APPOINTMENT (OUTPATIENT)
Dept: FAMILY MEDICINE | Facility: CLINIC | Age: 72
End: 2022-09-28

## 2022-09-28 VITALS
BODY MASS INDEX: 20.95 KG/M2 | RESPIRATION RATE: 14 BRPM | WEIGHT: 172 LBS | HEART RATE: 68 BPM | HEIGHT: 76 IN | DIASTOLIC BLOOD PRESSURE: 76 MMHG | OXYGEN SATURATION: 99 % | SYSTOLIC BLOOD PRESSURE: 116 MMHG | TEMPERATURE: 97.2 F

## 2022-09-28 DIAGNOSIS — R29.898 OTHER SYMPTOMS AND SIGNS INVOLVING THE MUSCULOSKELETAL SYSTEM: ICD-10-CM

## 2022-09-28 PROCEDURE — 99214 OFFICE O/P EST MOD 30 MIN: CPT

## 2022-09-28 RX ORDER — FOLIC ACID 1 MG/1
1 TABLET ORAL
Qty: 90 | Refills: 0 | Status: ACTIVE | COMMUNITY
Start: 2019-05-08

## 2022-09-28 RX ORDER — APIXABAN 5 MG/1
5 TABLET, FILM COATED ORAL
Refills: 0 | Status: ACTIVE | COMMUNITY
Start: 2019-03-05

## 2022-09-28 RX ORDER — METHOTREXATE 2.5 MG/1
2.5 TABLET ORAL
Refills: 0 | Status: ACTIVE | COMMUNITY
Start: 2019-04-16

## 2022-09-28 NOTE — PHYSICAL EXAM
[Normal Sclera/Conjunctiva] : normal sclera/conjunctiva [Normal Outer Ear/Nose] : the outer ears and nose were normal in appearance [No JVD] : no jugular venous distention [No Lymphadenopathy] : no lymphadenopathy [Supple] : supple [Thyroid Normal, No Nodules] : the thyroid was normal and there were no nodules present [No Respiratory Distress] : no respiratory distress  [No Accessory Muscle Use] : no accessory muscle use [No Murmur] : no murmur heard [No Extremity Clubbing/Cyanosis] : no extremity clubbing/cyanosis [No Spinal Tenderness] : no spinal tenderness [Kyphosis] : kyphosis [Normal Affect] : the affect was normal [Normal Insight/Judgement] : insight and judgment were intact [de-identified] : yellowish hue  [de-identified] : wheezing upper lung bases  [de-identified] : irregular, irregular  [de-identified] : b/l LE sensation intact, decreased in motor strength b/l LE's  [de-identified] : uses cane

## 2022-09-28 NOTE — REVIEW OF SYSTEMS
[Headache] : no headache [Dizziness] : no dizziness [Fainting] : no fainting [Confusion] : no confusion [Unsteady Walk] : ataxia [Memory Loss] : no memory loss [Negative] : Heme/Lymph

## 2022-09-28 NOTE — PLAN
[FreeTextEntry1] : continue all medications as directed \par healthy diet and physical activity as tolerated\par will request home PT \par fall precautions advised \par + wheezing on exam, continue use of albuterol as directed (VSS) \par will request Clarke, PET scan and recent labs \par reviewed prior cardio note \par RTO as routine for follow up.\par \par

## 2022-09-28 NOTE — HISTORY OF PRESENT ILLNESS
[FreeTextEntry1] : pt presents for 3 month follow up  [de-identified] : 72 yr old male is here for follow up. He admits to leg weakness and is needing more assistance when standing at home. He is still taking all medications. Denies changes in vision, dizziness, chest pain, palpitations and lower extremity edema.\par

## 2022-12-20 ENCOUNTER — APPOINTMENT (OUTPATIENT)
Dept: FAMILY MEDICINE | Facility: CLINIC | Age: 72
End: 2022-12-20

## 2022-12-20 VITALS
SYSTOLIC BLOOD PRESSURE: 112 MMHG | RESPIRATION RATE: 14 BRPM | TEMPERATURE: 94 F | DIASTOLIC BLOOD PRESSURE: 80 MMHG | BODY MASS INDEX: 20.95 KG/M2 | WEIGHT: 172 LBS | HEIGHT: 76 IN | OXYGEN SATURATION: 98 % | HEART RATE: 71 BPM

## 2022-12-20 PROCEDURE — 99214 OFFICE O/P EST MOD 30 MIN: CPT | Mod: 25

## 2022-12-20 NOTE — PLAN
[FreeTextEntry1] : reviewed [place] ER report and labs \par continue all medications as directed \par healthy diet and physical activity as tolerated\par fall precautions \par f/u with GI for planned EGD \par f/u with cardio, oncologist as directed \par RTO as routine for follow up.\par

## 2022-12-20 NOTE — HISTORY OF PRESENT ILLNESS
[FreeTextEntry1] : Patient presents for 3 month follow up. [de-identified] : 72 yr old male is here for follow up. He was seen at [place] ER due to brownish tinged blood on pillow when waking up. At the time CXR, CT abdomen was done. Labs showing no anemia. Today he reports no reoccurrence however he has abdominal bloating and nausea at times. Denies weight loss, GERD, dysphagia, melena. \par \par Planned EGD on 1/6/22 with Dr. Crisostomo

## 2022-12-20 NOTE — REVIEW OF SYSTEMS
[Abdominal Pain] : no abdominal pain [Nausea] : no nausea [Constipation] : no constipation [Diarrhea] : no diarrhea [Vomiting] : no vomiting [Heartburn] : no heartburn [Melena] : no melena [Headache] : no headache [Dizziness] : no dizziness [Fainting] : no fainting [Confusion] : no confusion [Unsteady Walk] : ataxia [Memory Loss] : no memory loss [Negative] : Heme/Lymph [FreeTextEntry7] : abdmoinal bloating

## 2022-12-20 NOTE — PHYSICAL EXAM
[No Acute Distress] : no acute distress [Normal Sclera/Conjunctiva] : normal sclera/conjunctiva [Normal Outer Ear/Nose] : the outer ears and nose were normal in appearance [No JVD] : no jugular venous distention [No Respiratory Distress] : no respiratory distress  [No Accessory Muscle Use] : no accessory muscle use [Clear to Auscultation] : lungs were clear to auscultation bilaterally [No Varicosities] : no varicosities [Pedal Pulses Present] : the pedal pulses are present [No Extremity Clubbing/Cyanosis] : no extremity clubbing/cyanosis [Kyphosis] : kyphosis [Alert and Oriented x3] : oriented to person, place, and time [de-identified] : irregular, irregular, trace diastolic murmur  [de-identified] : uses cane

## 2023-01-04 ENCOUNTER — APPOINTMENT (OUTPATIENT)
Dept: FAMILY MEDICINE | Facility: CLINIC | Age: 73
End: 2023-01-04

## 2023-02-07 ENCOUNTER — APPOINTMENT (OUTPATIENT)
Dept: FAMILY MEDICINE | Facility: CLINIC | Age: 73
End: 2023-02-07
Payer: MEDICARE

## 2023-02-07 VITALS
OXYGEN SATURATION: 93 % | DIASTOLIC BLOOD PRESSURE: 65 MMHG | WEIGHT: 156 LBS | BODY MASS INDEX: 19 KG/M2 | HEART RATE: 62 BPM | SYSTOLIC BLOOD PRESSURE: 110 MMHG | HEIGHT: 76 IN | RESPIRATION RATE: 14 BRPM

## 2023-02-07 VITALS — TEMPERATURE: 96.6 F

## 2023-02-07 DIAGNOSIS — K44.9 DIAPHRAGMATIC HERNIA W/OUT OBSTRUCTION OR GANGRENE: ICD-10-CM

## 2023-02-07 DIAGNOSIS — Z01.818 ENCOUNTER FOR OTHER PREPROCEDURAL EXAMINATION: ICD-10-CM

## 2023-02-07 DIAGNOSIS — R14.0 ABDOMINAL DISTENSION (GASEOUS): ICD-10-CM

## 2023-02-07 DIAGNOSIS — K29.50 UNSPECIFIED CHRONIC GASTRITIS W/OUT BLEEDING: ICD-10-CM

## 2023-02-07 PROCEDURE — 99215 OFFICE O/P EST HI 40 MIN: CPT

## 2023-02-07 NOTE — HISTORY OF PRESENT ILLNESS
[Atrial Fibrillation] : atrial fibrillation [No Pertinent Pulmonary History] : no history of asthma, COPD, sleep apnea, or smoking [No Adverse Anesthesia Reaction] : no adverse anesthesia reaction in self or family member [Chronic Anticoagulation] : chronic anticoagulation [(Patient denies any chest pain, claudication, dyspnea on exertion, orthopnea, palpitations or syncope)] : Patient denies any chest pain, claudication, dyspnea on exertion, orthopnea, palpitations or syncope [Moderate (4-6 METs)] : Moderate (4-6 METs) [Aortic Stenosis] : no aortic stenosis [Coronary Artery Disease] : no coronary artery disease [Recent Myocardial Infarction] : no recent myocardial infarction [Implantable Device/Pacemaker] : no implantable device/pacemaker [Chronic Kidney Disease] : no chronic kidney disease [Diabetes] : no diabetes [FreeTextEntry1] : left thumb surgery  [FreeTextEntry2] : 02/08/2023 [FreeTextEntry3] : Dr. Tran [FreeTextEntry4] : pt presents for medical clearance he believes to have presurgical testing done at [facility] and is where he will receiving his surgery.

## 2023-02-07 NOTE — PHYSICAL EXAM
[No Acute Distress] : no acute distress [Normal Sclera/Conjunctiva] : normal sclera/conjunctiva [Normal Outer Ear/Nose] : the outer ears and nose were normal in appearance [No JVD] : no jugular venous distention [No Lymphadenopathy] : no lymphadenopathy [Supple] : supple [Thyroid Normal, No Nodules] : the thyroid was normal and there were no nodules present [No Respiratory Distress] : no respiratory distress  [No Accessory Muscle Use] : no accessory muscle use [Clear to Auscultation] : lungs were clear to auscultation bilaterally [No Carotid Bruits] : no carotid bruits [No Varicosities] : no varicosities [Pedal Pulses Present] : the pedal pulses are present [No Extremity Clubbing/Cyanosis] : no extremity clubbing/cyanosis [Kyphosis] : kyphosis [Alert and Oriented x3] : oriented to person, place, and time [de-identified] : irregular, irregular, trace diastolic murmur  [de-identified] : left thumb dislocated  [de-identified] : uses cane

## 2023-02-07 NOTE — COUNSELING
[Activity counseling provided] : activity [Fall prevention counseling provided] : fall prevention  [Good understanding] : Patient has a good understanding of disease, goals and obesity follow-up plan [Adequate lighting] : Adequate lighting [No throw rugs] : No throw rugs [Use proper foot wear] : Use proper foot wear [Use recommended devices] : Use recommended devices

## 2023-02-07 NOTE — ASSESSMENT
[Patient Optimized for Surgery] : Patient optimized for surgery [No Further Testing Recommended] : no further testing recommended [Modify medications prior to procedure] : Modify medications prior to procedure [As per surgery] : as per surgery [FreeTextEntry4] : Patient is low risk and medically cleared for planned procedure on 2/8/2023\par presurgical labs and EKG have been reviewed \par cardiologist clearance reviewed\par Denies history of Myocardial infraction, CVA and QUINTERO \par May continue to take prescribed medications as directed. \par NPO after midnight prior to surgery \par may take morning medications with sips of water morning of procedure \par Advised not to take ASA and or NSAIDs prior  to surgery\par  [FreeTextEntry7] : hold Eliquis 2-3 days prior to planned procedure

## 2023-03-27 ENCOUNTER — APPOINTMENT (OUTPATIENT)
Dept: FAMILY MEDICINE | Facility: CLINIC | Age: 73
End: 2023-03-27
Payer: MEDICARE

## 2023-03-27 VITALS
HEIGHT: 76 IN | BODY MASS INDEX: 19.24 KG/M2 | WEIGHT: 158 LBS | OXYGEN SATURATION: 94 % | DIASTOLIC BLOOD PRESSURE: 70 MMHG | SYSTOLIC BLOOD PRESSURE: 120 MMHG | RESPIRATION RATE: 14 BRPM | HEART RATE: 74 BPM

## 2023-03-27 DIAGNOSIS — N39.0 URINARY TRACT INFECTION, SITE NOT SPECIFIED: ICD-10-CM

## 2023-03-27 DIAGNOSIS — R30.0 DYSURIA: ICD-10-CM

## 2023-03-27 LAB
BILIRUB UR QL STRIP: NEGATIVE
GLUCOSE UR-MCNC: NEGATIVE
HCG UR QL: 1 EU/DL
HGB UR QL STRIP.AUTO: NORMAL
KETONES UR-MCNC: NORMAL
LEUKOCYTE ESTERASE UR QL STRIP: NORMAL
NITRITE UR QL STRIP: POSITIVE
PH UR STRIP: 60
PROT UR STRIP-MCNC: 300
SP GR UR STRIP: 1.03

## 2023-03-27 PROCEDURE — 81003 URINALYSIS AUTO W/O SCOPE: CPT | Mod: QW

## 2023-03-27 PROCEDURE — 99214 OFFICE O/P EST MOD 30 MIN: CPT | Mod: 25

## 2023-03-27 NOTE — HISTORY OF PRESENT ILLNESS
[FreeTextEntry8] : pt states he has prostate cancer \par he has the urgency to go to the bathroom he also has burning while urinating \par this started last week \par \par 73 yo male with hx of stage 4 prostate Ca, treated by DEREK who presents for urinary symptoms. \par Says that he had radiation and chemo for it. \par Says he had had PSA down to 0. \par Says 5 years later, still with low PSA at 0. \par Last week, started to have increased urinary frequency.\par Friday started to rush to bathroom or would urinate on himself.\par Yesterday had 2 episodes of incontinence with unable to get to bathroom on time. \par Has had multiple pairs of pants he went through. \par +dysuria \par no hematuria.\par No fevers or chills or flank pain.

## 2023-03-27 NOTE — REVIEW OF SYSTEMS
[Dysuria] : dysuria [Incontinence] : incontinence [Nocturia] : nocturia [Frequency] : frequency [Negative] : Gastrointestinal [Unsteady Walk] : ataxia [Hesitancy] : no hesitancy [Hematuria] : no hematuria [Headache] : no headache [Dizziness] : no dizziness [Memory Loss] : no memory loss

## 2023-03-27 NOTE — ASSESSMENT
[FreeTextEntry1] : UTI \par with dysuria \par POCT UA ordered showing large blood, positive nitrites and small leuks. trace ketones notee. 300 protein. \par urine cx ordered today\par sending keflex 500mg bid x 7 days, advised to take with food \par advised if culture is negative, may be component of prostatitis as patient did not have it resected. may require f/u with urology \par \par Prostate Ca\par chronic, stable\par oncology consult from 3/2023 reviewed and noted \par c/w ADT monotherapy with q3 month f/u with oncology. \par \par f/u if no improvement\par will call with cx results. \par Patient agrees with plan, all further questions answered during encounter.\par

## 2023-03-27 NOTE — PHYSICAL EXAM
[No Acute Distress] : no acute distress [Normal Voice/Communication] : normal voice/communication [Normal] : no respiratory distress, lungs were clear to auscultation bilaterally and no accessory muscle use [Normal Rate] : normal rate  [Normal S1, S2] : normal S1 and S2 [Soft] : abdomen soft [Non Tender] : non-tender [Non-distended] : non-distended [Normal Bowel Sounds] : normal bowel sounds [No CVA Tenderness] : no CVA  tenderness [Coordination Grossly Intact] : coordination grossly intact [No Focal Deficits] : no focal deficits [de-identified] : elderly gentleman, mildly cachectic and pale in appearance  [de-identified] : irregularly irregular  [de-identified] : back brace in place  [de-identified] : using cane for gait assistance

## 2023-03-30 LAB — BACTERIA UR CULT: ABNORMAL

## 2023-04-15 NOTE — DISCHARGE NOTE PROVIDER - NSDCQMSTAIRS_GEN_ALL_CORE
CONSULTATION NOTE - STROKE NEUROLOGY      CHIEF COMPLAINT  Stroke/TIA evaluation    HISTORY OF PRESENT ILLNESS  Ms. Cintron is a 76 year old F with a PMH of R MCA stroke several years ago of unknown cause, multifocal subcortical strokes, not on antithrombotic therapy at home, question seizure hx in 2018 who presented to [facility] as a transfer from [facility] for L PCA occlusion     Patient is encephalopathic and the history was obtained entirely from the chart and son.  Briefly, patient has a history of a right MCA stroke several years ago, at least 6-7 years ago per son. She did not immediately go to the doctor but she sought medical care 7 months after, and was told of the stroke.  She does not take any antiplatelet or blood thinner.  Prior scans at [facility] in 2018 (available in care everywhere) show that she has an old right pontine, subcortical basal ganglia, and R MCA cortical stroke.  She also had an episode of reported generalized tonic clonic seizures in 2018 and she was started on keppra 500 mg BID during a visit to ER, but son reports she is not on keppra or any other prescribed medications.  She takes some vitamins however.     Today she woke up with right sided weakness and confusion, was not answering questions appropriately and was taken to [facility]. LKW was 10 PM last night, and symptoms were discovered this morning upon awakening. She was called as a Code Stroke. NIHSS was 19 on arrival, BP was in the 140s.  She had to receive haloperidol due to agitation. She had a CTH showing chronic infarcts. CTA showed a left PCA occlusoin but there was no large established L PCA stroke on CTH.     She was out of the window for tenecteplase and was transferred to [facility] direct to , where she underwent thrombectomy of left P1 occlusion with TICI 3 recanalization.  She was then admitted to the ICU where she has been since about 3 PM.  Since procedure patient still very encephalopathic, will not open eyes  spontaneously or to loud voice, not vocalizing, not following commands but does withdraw all 4 limbs, left more than R.      She is noted to be in atrial fibrillation on telemetry.      If IV Alteplase/tPA not administered, the patient is not eligible for acute reperfusion therapy because:  [x] Outside of 4.5 hour treatment window      Endovascular treatment:  [x] Patient is eligible for acute endovascular treatment/clot retrieval.    HISTORIES:  Patient Active Problem List    Diagnosis Date Noted   • Cerebrovascular accident (CVA) due to thrombosis of left posterior cerebral artery (CMD) 04/15/2023     Priority: Low   • Acute ischemic left PCA stroke (CMD) 04/15/2023     Priority: Low       Past Medical History:   Diagnosis Date   • Cerebral infarction (CMD)        No past surgical history on file.    Social History     Tobacco Use   • Smoking status: Never   • Smokeless tobacco: Never   Vaping Use   • Vaping Use: never used   Substance Use Topics   • Alcohol use: Never   • Drug use: Never       No family history on file.    INPATIENT MEDICATIONS:  Current Facility-Administered Medications   Medication Dose Route Frequency Provider Last Rate Last Admin   • iodixanol (VISIPAQUE) 320 MG/ML injection 100 mL  100 mL Intra-arterial Once Kyle Eli CNP       • sodium chloride 0.9 % flush bag 25 mL  25 mL Intravenous PRN Kyle Eli CNP       • sodium chloride (PF) 0.9 % injection 2 mL  2 mL Intracatheter 2 times per day Kyle Eli CNP       • acetaminophen (TYLENOL) tablet 650 mg  650 mg Oral Q4H PRN Holly Lennon CNP        Or   • acetaminophen (TYLENOL) suppository 650 mg  650 mg Rectal Q4H PRN Holly Lennon CNP       • sodium chloride 0.9 % flush bag 25 mL  25 mL Intravenous PRN Holly Lennon CNP       • sodium chloride (PF) 0.9 % injection 2 mL  2 mL Intracatheter 2 times per day Holly Lennon CNP       • sodium chloride (NORMAL SALINE) 0.9 % bolus 500 mL  500 mL Intravenous PRN Holly LIMA  Saji, CNP       • dextrose 50 % injection 25 g  25 g Intravenous PRN Holly Lennon CNP       • dextrose 50 % injection 12.5 g  12.5 g Intravenous PRN Holly Lennon CNP       • glucagon (GLUCAGEN) injection 1 mg  1 mg Intramuscular PRN Holly Lennon CNP       • dextrose (GLUTOSE) 40 % gel 15 g  15 g Oral PRN Holly Lennon CNP       • dextrose (GLUTOSE) 40 % gel 30 g  30 g Oral PRN Holly Lennon CNP       • labetalol (NORMODYNE) injection 10 mg  10 mg Intravenous Q30 Min PRN Holly Lennon CNP   10 mg at 04/15/23 1539   • hydrALAZINE (APRESOLINE) injection 10 mg  10 mg Intravenous Once PRN Holly Lennon CNP       • ondansetron (ZOFRAN ODT) disintegrating tablet 4 mg  4 mg Oral Q12H PRN Holly Lennon CNP        Or   • ondansetron (ZOFRAN) injection 4 mg  4 mg Intravenous Q12H PRN Holly Lennon CNP       • [START ON 4/16/2023] atorvastatin (LIPITOR) tablet 80 mg  80 mg Oral Nightly Holly Lennon CNP       • sodium chloride 0.9% infusion   Intravenous Continuous Holly Lennon CNP 50 mL/hr at 04/15/23 1604 New Bag at 04/15/23 1604   • insulin regular (human) (HumuLIN R, NovoLIN R) correction Dose   Subcutaneous 4 times per day Holly Lennon CNP       • LABETALOL HCL 5 MG/ML SOLN(WRAPPED) Pyxis Override                 HOME MEDICATIONS:  No medications prior to admission.       ALLERGIES:  Allergies as of 04/15/2023   • (No Known Allergies)        REVIEW OF SYSTEMS :   10 point review of systems was unremarkable other than as documented in the HPI.    PHYSICAL EXAM  Vitals:  BP (!) 146/85   Pulse 75   Temp 97 °F (36.1 °C) (Temporal)   Resp 19   Ht 5' 9\" (1.753 m)   Wt 80.3 kg (177 lb 0.5 oz)   BMI 26.14 kg/m²   BSA 1.96 m²   General: Well-developed, well-nourished female.  Lethargic.   Head & Neck:  Normocephalic and atraumatic.     EENT: Normal conjunctiva.  No nasal drainage.   Normal appearance to ear structures.  Heart: Appears to be in a fib on telemetry    Lungs:  Normal resp effort.   Extremities:  No edema in lower extremities. No amputations. RLE immobilizer in place.    Musculoskeletal:  No bony tenderness or deformities.   Skin:  Warm and dry.  No obvious rashes noted.  Psychiatric:  Lethargic patient.   Neurologic:   Mental status - Patient is lethargic. Eyes are closed, appears to be sleeping. Does not open eyes on her own or to command or stimuli.  Does not follow any commands or answer questions. Will mumble and resist attempts to move her limbs.   CN - PERRL 3 to 2.  No overt gaze deviation but difficult to confirm.  Unable to assess blink to threat as resists eyelid opening. ?R facial droop. Dysarthria  Motor - Moves all four limbs to stimuli. Tone is increased bilaterally.  Does not move to command or spontaneously at present time, but will resist attempts to move her arms and will withdraw both legs.  Noted to be moving L more easily than R side.    Sensory - responds to stim bilaterally, grimaces.  Unable to test coordination   Patient is not ambulatory at this time.      LABORATORY  I have reviewed the pertinent laboratory tests:  Recent Labs   Lab 04/15/23  1012   WBC 5.7   RBC 5.06   HGB 12.3   HCT 41.4        Recent Labs   Lab 04/15/23  1012   SODIUM 142   POTASSIUM 4.1   CHLORIDE 114*   CO2 27   BUN 13   CREATININE 0.74   CALCIUM 9.3   ALBUMIN 3.5*   BILIRUBIN 0.5   ALKPT 60   GPT 13   AST 28   GLUCOSE 106*     Recent Labs   Lab 04/15/23  1012   PTT 24   PT 10.3   INR 1.0     No results found  No results found for: HGBA1C]    IMAGING/OTHER TESTING:  I have reviewed the pertinent imaging study reports.      IR INTRACRANIAL ARTERY MECHANICAL THROMBECTOMY AND/OR THROMBOLYSIS    (Results Pending)   MRI BRAIN WO CONTRAST    (Results Pending)   CT HEAD DUAL ENERGY WO CONTRAST    (Results Pending)     CT chronic R MCA stroke    CTA L PCA occlusion       Pre and post angio    TTE/ARLEN results: - pending       ASSESSMENT/PLAN  This is a  76 year old  F with a PMH of a R MCA stroke of unknown cause, prior subcortical strokes, who presented with acute onset confusion and R sided weakness upon awakening found to have a L PCA occlusion, s/p thrombectomy.     1. Left PCA occlusion s/p thrombectomy. Suspect cardioembolic given apparent AF on telemetry     2. New discovered a fib (on telemetry, not confirmed yet on EKG)     3. Chronic R MCA cortical stroke, chronic subcortical infarcts.     4. Prediabtes - new diagnosis     Plan   - neuro ICU level of care  - neuro checks, vitals, NIHSS, groin checks per unit routine  - -160 post successful recanalization   - MRI brain when able   - TTE pending   - PT, OT, speech; physiatry when able to participate   - antiplatelet: on hold until follow up scans done, likely start ASA tomororw.    - statin: atorva 80 for now, titrate based on lipid panel   - neuro IR following, appreciate recs   - will need to determine start date for anticoagulation pending stroke burden on MRI for a fib (not a known diagnosis per son)   - DVT prophylaxis when OK w neurosurgery  - diet: NPO for now given mental status. Reevaluate as clinical status allows.    - will continue to follow  - D/w NCCU team, patient, and her son at bedside     .Elsie Benjamin MD   (available via [employer])  [facility] - Neurology Attending  Date: 4/15/2023     No

## 2023-09-20 NOTE — ASU PREOP CHECKLIST - VERIFY SURGICAL SITE/SIDE WITH PATIENT
done Complex Repair And Dorsal Nasal Flap Text: The defect edges were debeveled with a #15 scalpel blade.  The primary defect was closed partially with a complex linear closure.  Given the location of the remaining defect, shape of the defect and the proximity to free margins a dorsal nasal flap was deemed most appropriate for complete closure of the defect.  Using a sterile surgical marker, an appropriate flap was drawn incorporating the defect and placing the expected incisions within the relaxed skin tension lines where possible.    The area thus outlined was incised deep to adipose tissue with a #15 scalpel blade.  The skin margins were undermined to an appropriate distance in all directions utilizing iris scissors.

## 2023-09-26 ENCOUNTER — APPOINTMENT (OUTPATIENT)
Dept: FAMILY MEDICINE | Facility: CLINIC | Age: 73
End: 2023-09-26
Payer: MEDICARE

## 2023-09-26 VITALS
WEIGHT: 147 LBS | HEART RATE: 88 BPM | DIASTOLIC BLOOD PRESSURE: 78 MMHG | BODY MASS INDEX: 17.9 KG/M2 | OXYGEN SATURATION: 97 % | HEIGHT: 76 IN | TEMPERATURE: 97.6 F | SYSTOLIC BLOOD PRESSURE: 123 MMHG

## 2023-09-26 DIAGNOSIS — R63.4 ABNORMAL WEIGHT LOSS: ICD-10-CM

## 2023-09-26 DIAGNOSIS — R05.9 COUGH, UNSPECIFIED: ICD-10-CM

## 2023-09-26 DIAGNOSIS — R21 RASH AND OTHER NONSPECIFIC SKIN ERUPTION: ICD-10-CM

## 2023-09-26 DIAGNOSIS — R09.89 OTHER SPECIFIED SYMPTOMS AND SIGNS INVOLVING THE CIRCULATORY AND RESPIRATORY SYSTEMS: ICD-10-CM

## 2023-09-26 PROCEDURE — 99214 OFFICE O/P EST MOD 30 MIN: CPT

## 2023-09-29 LAB — BACTERIA SPT CULT: ABNORMAL

## 2023-10-01 PROBLEM — Z92.3 HISTORY OF RADIATION THERAPY: Status: RESOLVED | Noted: 2020-01-14 | Resolved: 2023-10-01

## 2023-10-02 ENCOUNTER — APPOINTMENT (OUTPATIENT)
Dept: PULMONOLOGY | Facility: CLINIC | Age: 73
End: 2023-10-02
Payer: MEDICARE

## 2023-10-02 VITALS
SYSTOLIC BLOOD PRESSURE: 82 MMHG | DIASTOLIC BLOOD PRESSURE: 60 MMHG | OXYGEN SATURATION: 97 % | WEIGHT: 152 LBS | TEMPERATURE: 97.1 F | HEART RATE: 72 BPM | BODY MASS INDEX: 18.51 KG/M2 | HEIGHT: 76 IN

## 2023-10-02 DIAGNOSIS — R93.89 ABNORMAL FINDINGS ON DIAGNOSTIC IMAGING OF OTHER SPECIFIED BODY STRUCTURES: ICD-10-CM

## 2023-10-02 PROCEDURE — 99205 OFFICE O/P NEW HI 60 MIN: CPT | Mod: 25

## 2023-10-02 PROCEDURE — 94729 DIFFUSING CAPACITY: CPT

## 2023-10-02 PROCEDURE — 94010 BREATHING CAPACITY TEST: CPT

## 2023-10-02 PROCEDURE — 94727 GAS DIL/WSHOT DETER LNG VOL: CPT

## 2023-10-02 RX ORDER — DIPHENOXYLATE HYDROCHLORIDE AND ATROPINE SULFATE 2.5; .025 MG/1; MG/1
2.5-0.025 TABLET ORAL
Qty: 25 | Refills: 0 | Status: COMPLETED | COMMUNITY
Start: 2019-06-17 | End: 2023-10-02

## 2023-10-02 RX ORDER — ONDANSETRON 4 MG/1
4 TABLET, ORALLY DISINTEGRATING ORAL EVERY 6 HOURS
Qty: 30 | Refills: 0 | Status: COMPLETED | COMMUNITY
Start: 2021-03-24 | End: 2023-10-02

## 2023-10-02 RX ORDER — PANTOPRAZOLE SODIUM 40 MG/1
40 TABLET, DELAYED RELEASE ORAL TWICE DAILY
Refills: 0 | Status: COMPLETED | COMMUNITY
End: 2023-10-02

## 2023-10-02 RX ORDER — CEPHALEXIN 500 MG/1
500 CAPSULE ORAL TWICE DAILY
Qty: 14 | Refills: 0 | Status: DISCONTINUED | COMMUNITY
Start: 2023-03-27 | End: 2023-10-02

## 2023-10-18 ENCOUNTER — APPOINTMENT (OUTPATIENT)
Dept: FAMILY MEDICINE | Facility: CLINIC | Age: 73
End: 2023-10-18
Payer: MEDICARE

## 2023-10-18 VITALS
RESPIRATION RATE: 14 BRPM | DIASTOLIC BLOOD PRESSURE: 60 MMHG | HEART RATE: 82 BPM | HEIGHT: 76 IN | TEMPERATURE: 98.4 F | SYSTOLIC BLOOD PRESSURE: 130 MMHG | BODY MASS INDEX: 18.39 KG/M2 | WEIGHT: 151 LBS | OXYGEN SATURATION: 99 %

## 2023-10-18 DIAGNOSIS — K21.9 GASTRO-ESOPHAGEAL REFLUX DISEASE W/OUT ESOPHAGITIS: ICD-10-CM

## 2023-10-18 PROCEDURE — 99213 OFFICE O/P EST LOW 20 MIN: CPT

## 2023-11-02 ENCOUNTER — NON-APPOINTMENT (OUTPATIENT)
Age: 73
End: 2023-11-02

## 2023-11-03 ENCOUNTER — APPOINTMENT (OUTPATIENT)
Dept: PULMONOLOGY | Facility: CLINIC | Age: 73
End: 2023-11-03

## 2023-11-07 ENCOUNTER — NON-APPOINTMENT (OUTPATIENT)
Age: 73
End: 2023-11-07

## 2023-11-09 ENCOUNTER — APPOINTMENT (OUTPATIENT)
Dept: FAMILY MEDICINE | Facility: CLINIC | Age: 73
End: 2023-11-09

## 2024-01-18 ENCOUNTER — APPOINTMENT (OUTPATIENT)
Dept: FAMILY MEDICINE | Facility: CLINIC | Age: 74
End: 2024-01-18

## 2024-02-07 ENCOUNTER — NON-APPOINTMENT (OUTPATIENT)
Age: 74
End: 2024-02-07

## 2024-02-23 ENCOUNTER — RX RENEWAL (OUTPATIENT)
Age: 74
End: 2024-02-23

## 2024-02-23 RX ORDER — NYSTATIN 100000 [USP'U]/G
100000 CREAM TOPICAL 3 TIMES DAILY
Qty: 30 | Refills: 1 | Status: ACTIVE | COMMUNITY
Start: 2023-09-26 | End: 1900-01-01

## 2024-04-10 ENCOUNTER — NON-APPOINTMENT (OUTPATIENT)
Age: 74
End: 2024-04-10

## 2024-04-11 ENCOUNTER — NON-APPOINTMENT (OUTPATIENT)
Age: 74
End: 2024-04-11

## 2024-04-29 RX ORDER — BENZONATATE 200 MG/1
200 CAPSULE ORAL 3 TIMES DAILY
Qty: 15 | Refills: 1 | Status: ACTIVE | COMMUNITY
Start: 2024-04-29 | End: 1900-01-01

## 2024-04-30 ENCOUNTER — APPOINTMENT (OUTPATIENT)
Dept: FAMILY MEDICINE | Facility: CLINIC | Age: 74
End: 2024-04-30
Payer: MEDICARE

## 2024-04-30 VITALS
BODY MASS INDEX: 17.78 KG/M2 | TEMPERATURE: 98.3 F | HEART RATE: 94 BPM | DIASTOLIC BLOOD PRESSURE: 80 MMHG | RESPIRATION RATE: 15 BRPM | SYSTOLIC BLOOD PRESSURE: 108 MMHG | OXYGEN SATURATION: 97 % | HEIGHT: 76 IN | WEIGHT: 146 LBS

## 2024-04-30 DIAGNOSIS — Z09 ENCOUNTER FOR FOLLOW-UP EXAMINATION AFTER COMPLETED TREATMENT FOR CONDITIONS OTHER THAN MALIGNANT NEOPLASM: ICD-10-CM

## 2024-04-30 DIAGNOSIS — C61 MALIGNANT NEOPLASM OF PROSTATE: ICD-10-CM

## 2024-04-30 DIAGNOSIS — R26.81 UNSTEADINESS ON FEET: ICD-10-CM

## 2024-04-30 DIAGNOSIS — M06.9 RHEUMATOID ARTHRITIS, UNSPECIFIED: ICD-10-CM

## 2024-04-30 DIAGNOSIS — K31.84 GASTROPARESIS: ICD-10-CM

## 2024-04-30 DIAGNOSIS — I48.91 UNSPECIFIED ATRIAL FIBRILLATION: ICD-10-CM

## 2024-04-30 DIAGNOSIS — J44.9 CHRONIC OBSTRUCTIVE PULMONARY DISEASE, UNSPECIFIED: ICD-10-CM

## 2024-04-30 PROCEDURE — G2211 COMPLEX E/M VISIT ADD ON: CPT

## 2024-04-30 PROCEDURE — 99215 OFFICE O/P EST HI 40 MIN: CPT

## 2024-04-30 NOTE — HISTORY OF PRESENT ILLNESS
[Post-hospitalization from ___ Hospital] : Post-hospitalization from [unfilled] Hospital [Admitted on: ___] : The patient was admitted on [unfilled] [Discharged on ___] : discharged on [unfilled] [FreeTextEntry2] : 73M is here for hospital follow up. Admitted to [facility] due to AMS found to have sepsis 2/2 to UTI. Today patient reports having cough due to allergies. He is tolerating diet. Denies SOB, fever, chills, dysuria and recent falls.

## 2024-04-30 NOTE — PLAN
[FreeTextEntry1] :  HFU 2/2 to UTI, resolved at this time  reviewed hospital labs, consult note and imaging    1. weight loss: stable at this time reviewed pulm note COPD continue smaller frequent meals due to gastroparesis diet as tolerated  2. Prostate cancer in remission: reviewed most recent consult note  3. BP in office within goal range BP goal <140/80 healthy diet and physical activity as tolerated continue Eliquis, metoprolol  4. RA: f/u with rheum continue medications fall precautions advised.

## 2024-04-30 NOTE — PHYSICAL EXAM
[No Acute Distress] : no acute distress [Normal Sclera/Conjunctiva] : normal sclera/conjunctiva [Normal Outer Ear/Nose] : the outer ears and nose were normal in appearance [No JVD] : no jugular venous distention [No Lymphadenopathy] : no lymphadenopathy [Supple] : supple [No Respiratory Distress] : no respiratory distress  [No Accessory Muscle Use] : no accessory muscle use [Clear to Auscultation] : lungs were clear to auscultation bilaterally [Normal Rate] : normal rate  [Regular Rhythm] : with a regular rhythm [Normal S1, S2] : normal S1 and S2 [No Murmur] : no murmur heard [No Extremity Clubbing/Cyanosis] : no extremity clubbing/cyanosis [Alert and Oriented x3] : oriented to person, place, and time [de-identified] : uses cane

## 2024-05-01 RX ORDER — ALBUTEROL SULFATE 90 UG/1
108 (90 BASE) INHALANT RESPIRATORY (INHALATION)
Qty: 1 | Refills: 6 | Status: ACTIVE | COMMUNITY
Start: 2021-10-01 | End: 1900-01-01

## 2024-05-27 NOTE — PHYSICAL EXAM
[Normal Sclera/Conjunctiva] : normal sclera/conjunctiva [Normal Outer Ear/Nose] : the outer ears and nose were normal in appearance [No JVD] : no jugular venous distention [No Lymphadenopathy] : no lymphadenopathy [Supple] : supple [Thyroid Normal, No Nodules] : the thyroid was normal and there were no nodules present [No Respiratory Distress] : no respiratory distress  [No Accessory Muscle Use] : no accessory muscle use [Clear to Auscultation] : lungs were clear to auscultation bilaterally [Normal Rate] : normal rate  I have personally evaluated and examined the patient. The Attending was available to me as a supervising provider if needed. [Regular Rhythm] : with a regular rhythm [Normal S1, S2] : normal S1 and S2 [No Murmur] : no murmur heard [No Extremity Clubbing/Cyanosis] : no extremity clubbing/cyanosis [de-identified] : yellowish hue  [de-identified] : uses cane

## 2024-07-01 ENCOUNTER — APPOINTMENT (OUTPATIENT)
Dept: FAMILY MEDICINE | Facility: CLINIC | Age: 74
End: 2024-07-01

## 2024-07-09 ENCOUNTER — APPOINTMENT (OUTPATIENT)
Dept: FAMILY MEDICINE | Facility: CLINIC | Age: 74
End: 2024-07-09
Payer: MEDICARE

## 2024-07-09 VITALS
SYSTOLIC BLOOD PRESSURE: 108 MMHG | BODY MASS INDEX: 16.44 KG/M2 | HEART RATE: 77 BPM | WEIGHT: 135 LBS | TEMPERATURE: 98 F | OXYGEN SATURATION: 95 % | HEIGHT: 76 IN | DIASTOLIC BLOOD PRESSURE: 60 MMHG | RESPIRATION RATE: 15 BRPM

## 2024-07-09 DIAGNOSIS — R26.81 UNSTEADINESS ON FEET: ICD-10-CM

## 2024-07-09 DIAGNOSIS — R29.898 OTHER SYMPTOMS AND SIGNS INVOLVING THE MUSCULOSKELETAL SYSTEM: ICD-10-CM

## 2024-07-09 PROCEDURE — 99214 OFFICE O/P EST MOD 30 MIN: CPT

## 2024-07-09 PROCEDURE — G2211 COMPLEX E/M VISIT ADD ON: CPT

## 2024-07-15 ENCOUNTER — APPOINTMENT (OUTPATIENT)
Dept: FAMILY MEDICINE | Facility: CLINIC | Age: 74
End: 2024-07-15
Payer: MEDICARE

## 2024-07-15 PROCEDURE — 81003 URINALYSIS AUTO W/O SCOPE: CPT | Mod: QW

## 2024-07-15 RX ORDER — CIPROFLOXACIN HYDROCHLORIDE 500 MG/1
500 TABLET, FILM COATED ORAL
Qty: 14 | Refills: 0 | Status: ACTIVE | COMMUNITY
Start: 2024-07-15 | End: 1900-01-01

## 2024-07-17 ENCOUNTER — APPOINTMENT (OUTPATIENT)
Dept: UROLOGY | Facility: CLINIC | Age: 74
End: 2024-07-17
Payer: MEDICARE

## 2024-07-17 VITALS
HEART RATE: 100 BPM | HEIGHT: 76 IN | BODY MASS INDEX: 17.05 KG/M2 | WEIGHT: 140 LBS | SYSTOLIC BLOOD PRESSURE: 127 MMHG | DIASTOLIC BLOOD PRESSURE: 92 MMHG

## 2024-07-17 DIAGNOSIS — N32.81 OVERACTIVE BLADDER: ICD-10-CM

## 2024-07-17 PROCEDURE — 99203 OFFICE O/P NEW LOW 30 MIN: CPT

## 2024-07-17 RX ORDER — VIBEGRON 75 MG/1
75 TABLET, FILM COATED ORAL
Qty: 90 | Refills: 3 | Status: ACTIVE | COMMUNITY
Start: 2024-07-17 | End: 1900-01-01

## 2024-07-18 ENCOUNTER — APPOINTMENT (OUTPATIENT)
Dept: FAMILY MEDICINE | Facility: CLINIC | Age: 74
End: 2024-07-18

## 2024-07-18 LAB — BACTERIA UR CULT: NORMAL

## 2024-07-19 ENCOUNTER — APPOINTMENT (OUTPATIENT)
Dept: FAMILY MEDICINE | Facility: CLINIC | Age: 74
End: 2024-07-19
Payer: MEDICARE

## 2024-07-19 VITALS
HEART RATE: 58 BPM | OXYGEN SATURATION: 98 % | SYSTOLIC BLOOD PRESSURE: 108 MMHG | HEIGHT: 76 IN | WEIGHT: 138 LBS | BODY MASS INDEX: 16.81 KG/M2 | DIASTOLIC BLOOD PRESSURE: 70 MMHG

## 2024-07-19 DIAGNOSIS — C61 MALIGNANT NEOPLASM OF PROSTATE: ICD-10-CM

## 2024-07-19 DIAGNOSIS — I48.91 UNSPECIFIED ATRIAL FIBRILLATION: ICD-10-CM

## 2024-07-19 DIAGNOSIS — J44.9 CHRONIC OBSTRUCTIVE PULMONARY DISEASE, UNSPECIFIED: ICD-10-CM

## 2024-07-19 DIAGNOSIS — B34.9 VIRAL INFECTION, UNSPECIFIED: ICD-10-CM

## 2024-07-19 PROCEDURE — 99213 OFFICE O/P EST LOW 20 MIN: CPT

## 2024-07-19 PROCEDURE — G2211 COMPLEX E/M VISIT ADD ON: CPT

## 2024-07-22 LAB
INFLUENZA A RESULT: NOT DETECTED
INFLUENZA B RESULT: NOT DETECTED
RESP SYN VIRUS RESULT: NOT DETECTED
SARS-COV-2 RESULT: NOT DETECTED

## 2024-08-26 ENCOUNTER — APPOINTMENT (OUTPATIENT)
Dept: UROLOGY | Facility: CLINIC | Age: 74
End: 2024-08-26
Payer: MEDICARE

## 2024-08-26 VITALS
BODY MASS INDEX: 16.81 KG/M2 | HEART RATE: 66 BPM | WEIGHT: 138 LBS | SYSTOLIC BLOOD PRESSURE: 109 MMHG | HEIGHT: 76 IN | DIASTOLIC BLOOD PRESSURE: 74 MMHG

## 2024-08-26 PROCEDURE — 99213 OFFICE O/P EST LOW 20 MIN: CPT

## 2024-08-26 NOTE — PHYSICAL EXAM
[Normal Appearance] : normal appearance [Well Groomed] : well groomed [General Appearance - In No Acute Distress] : no acute distress [Edema] : no peripheral edema [Respiration, Rhythm And Depth] : normal respiratory rhythm and effort [Exaggerated Use Of Accessory Muscles For Inspiration] : no accessory muscle use [Abdomen Soft] : soft [Abdomen Tenderness] : non-tender [Costovertebral Angle Tenderness] : no ~M costovertebral angle tenderness [Urinary Bladder Findings] : the bladder was normal on palpation [] : no rash [No Focal Deficits] : no focal deficits [Oriented To Time, Place, And Person] : oriented to person, place, and time [Affect] : the affect was normal [Mood] : the mood was normal [No Palpable Adenopathy] : no palpable adenopathy [FreeTextEntry1] : skinny but not cachectic [de-identified] : walking with cane

## 2024-08-26 NOTE — HISTORY OF PRESENT ILLNESS
[FreeTextEntry1] : 73 year old man with history of prostate cancer seen 07/17/2024 with complaint of urinary urgency. He reports frquency and urgency, sometimes with UUI. No dysuria, no hematuria. Feels he empties his bladder. Good stream, no straining. He is on tamsulosin 0.8 mg. Before this, he had obstructive symptoms with incomplete bladder emptying, weak steram, and straining. Those are no longer occuring but urgency remains. Random bladder scan 75 ml. He was diagnosed with prostate cancer 2016, PSA was 190, spread to sacrum. so de bud mCSPC. Treated with RT to prostate and pelvic mets with concurrent ADT. PSA nadired but BCR after testosterone recovery. He remains on ADT with lupron with Dr Martin at [facility] [place].   08/26/2024: Patient presents for follow up. He reports improvement of urgency with Gemtesa. No new complaints.

## 2024-08-26 NOTE — ASSESSMENT
[FreeTextEntry1] : 72 yo M with OAB, will continue  Gemtesa 75 mg daily.  For prostate cancer, will continue ADT at [facility].  RTO in 6 weeks for sx check.

## 2024-08-26 NOTE — ASSESSMENT
[FreeTextEntry1] : 72 yo M with OAB, will continue  Gemtesa 75 mg daily.  For prostate cancer, will continue ADT at [facility] [place].  RTO in 6 weeks for sx check.

## 2024-08-26 NOTE — HISTORY OF PRESENT ILLNESS
[FreeTextEntry1] : 73 year old man with history of prostate cancer seen 07/17/2024 with complaint of urinary urgency. He reports frquency and urgency, sometimes with UUI. No dysuria, no hematuria. Feels he empties his bladder. Good stream, no straining. He is on tamsulosin 0.8 mg. Before this, he had obstructive symptoms with incomplete bladder emptying, weak steram, and straining. Those are no longer occuring but urgency remains. Random bladder scan 75 ml. He was diagnosed with prostate cancer 2016, PSA was 190, spread to sacrum. so de bud mCSPC. Treated with RT to prostate and pelvic mets with concurrent ADT. PSA nadired but BCR after testosterone recovery. He remains on ADT with lupron with Dr Martin at [facility] [place].   08/26/2024: Patient presents for follow up. He reports improvement of urgency with Gemtesa. No new complaints.

## 2024-08-26 NOTE — PHYSICAL EXAM
[Normal Appearance] : normal appearance [Well Groomed] : well groomed [General Appearance - In No Acute Distress] : no acute distress [Edema] : no peripheral edema [Respiration, Rhythm And Depth] : normal respiratory rhythm and effort [Exaggerated Use Of Accessory Muscles For Inspiration] : no accessory muscle use [Abdomen Soft] : soft [Abdomen Tenderness] : non-tender [Costovertebral Angle Tenderness] : no ~M costovertebral angle tenderness [Urinary Bladder Findings] : the bladder was normal on palpation [] : no rash [No Focal Deficits] : no focal deficits [Oriented To Time, Place, And Person] : oriented to person, place, and time [Affect] : the affect was normal [Mood] : the mood was normal [No Palpable Adenopathy] : no palpable adenopathy [FreeTextEntry1] : skinny but not cachectic [de-identified] : walking with cane

## 2024-09-14 ENCOUNTER — APPOINTMENT (OUTPATIENT)
Dept: FAMILY MEDICINE | Facility: CLINIC | Age: 74
End: 2024-09-14
Payer: MEDICARE

## 2024-09-14 DIAGNOSIS — R21 RASH AND OTHER NONSPECIFIC SKIN ERUPTION: ICD-10-CM

## 2024-09-14 PROCEDURE — 99441: CPT | Mod: 93

## 2024-09-14 NOTE — HISTORY OF PRESENT ILLNESS
[Home] : at home, [unfilled] , at the time of the visit. [Medical Office: ([facility])___] : at the medical office located in  [Verbal consent obtained from patient] : the patient, [unfilled] [FreeTextEntry8] : Verbal consent was obtained from patient for telephonic visit on 9/14/24  States he was working in the garden and now has poison ivy that started on hands and now spread across abdomen and hips.

## 2024-09-14 NOTE — PHYSICAL EXAM
[Alert and Oriented x3] : oriented to person, place, and time [No Acute Distress] : no acute distress

## 2024-09-14 NOTE — PLAN
[FreeTextEntry1] : poison ivy rash topical and oral steroids  can use topical Benadryl as well  continue all medications as directed  RTO as routine for follow up.

## 2024-09-18 ENCOUNTER — APPOINTMENT (OUTPATIENT)
Dept: FAMILY MEDICINE | Facility: CLINIC | Age: 74
End: 2024-09-18
Payer: MEDICARE

## 2024-09-18 VITALS
SYSTOLIC BLOOD PRESSURE: 100 MMHG | OXYGEN SATURATION: 96 % | BODY MASS INDEX: 16.56 KG/M2 | WEIGHT: 136 LBS | RESPIRATION RATE: 15 BRPM | HEART RATE: 51 BPM | DIASTOLIC BLOOD PRESSURE: 60 MMHG | TEMPERATURE: 98.6 F | HEIGHT: 76 IN

## 2024-09-18 DIAGNOSIS — L23.7 ALLERGIC CONTACT DERMATITIS DUE TO PLANTS, EXCEPT FOOD: ICD-10-CM

## 2024-09-18 PROCEDURE — G2211 COMPLEX E/M VISIT ADD ON: CPT

## 2024-09-18 PROCEDURE — 99213 OFFICE O/P EST LOW 20 MIN: CPT

## 2024-09-18 RX ORDER — PREDNISONE 5 MG/1
5 TABLET ORAL
Qty: 7 | Refills: 0 | Status: ACTIVE | COMMUNITY
Start: 2024-09-14 | End: 1900-01-01

## 2024-09-18 NOTE — HISTORY OF PRESENT ILLNESS
[FreeTextEntry8] : Patient presents with poison ivy along the belt line and right thigh since Saturday.

## 2024-09-18 NOTE — PLAN
[FreeTextEntry1] : oral prednisone due to rash continue all medications as directed  RTO as routine for follow up.

## 2024-09-18 NOTE — PHYSICAL EXAM
[No Acute Distress] : no acute distress [Normal Sclera/Conjunctiva] : normal sclera/conjunctiva [Normal Outer Ear/Nose] : the outer ears and nose were normal in appearance [No JVD] : no jugular venous distention [No Respiratory Distress] : no respiratory distress  [Alert and Oriented x3] : oriented to person, place, and time [de-identified] : right groin/hip poison ivy  [de-identified] : uses cane

## 2024-10-09 ENCOUNTER — APPOINTMENT (OUTPATIENT)
Dept: FAMILY MEDICINE | Facility: CLINIC | Age: 74
End: 2024-10-09

## 2024-10-17 ENCOUNTER — APPOINTMENT (OUTPATIENT)
Dept: FAMILY MEDICINE | Facility: CLINIC | Age: 74
End: 2024-10-17

## 2024-12-09 NOTE — ASU PREOP CHECKLIST - SKIN PREP
Goal Outcome Evaluation: [id number]      Plan of Care Reviewed With: patient    Overall Patient Progress: improvingOverall Patient Progress: improving     Pt alert and oriented. Pt in bed all night and up to use the bathroom. Pt noted no migraines, no nausea, and only complained of abdominal pain. Pain managed by IV dilaudid q2hrs. Plan is for discharge today. Continue with POC.        n/a

## 2024-12-20 ENCOUNTER — APPOINTMENT (OUTPATIENT)
Dept: FAMILY MEDICINE | Facility: CLINIC | Age: 74
End: 2024-12-20

## 2024-12-20 ENCOUNTER — APPOINTMENT (OUTPATIENT)
Dept: UROLOGY | Facility: CLINIC | Age: 74
End: 2024-12-20

## 2025-01-08 ENCOUNTER — APPOINTMENT (OUTPATIENT)
Dept: FAMILY MEDICINE | Facility: CLINIC | Age: 75
End: 2025-01-08
Payer: MEDICARE

## 2025-01-08 VITALS
TEMPERATURE: 97.8 F | SYSTOLIC BLOOD PRESSURE: 102 MMHG | DIASTOLIC BLOOD PRESSURE: 60 MMHG | RESPIRATION RATE: 15 BRPM | HEIGHT: 76 IN

## 2025-01-08 PROCEDURE — G2211 COMPLEX E/M VISIT ADD ON: CPT

## 2025-01-08 PROCEDURE — 99213 OFFICE O/P EST LOW 20 MIN: CPT

## 2025-01-08 RX ORDER — CIPROFLOXACIN AND DEXAMETHASONE 3; 1 MG/ML; MG/ML
0.3-0.1 SUSPENSION/ DROPS AURICULAR (OTIC) TWICE DAILY
Qty: 1 | Refills: 1 | Status: ACTIVE | COMMUNITY
Start: 2025-01-08 | End: 1900-01-01

## 2025-01-13 DIAGNOSIS — H91.93 UNSPECIFIED HEARING LOSS, BILATERAL: ICD-10-CM

## 2025-01-16 ENCOUNTER — APPOINTMENT (OUTPATIENT)
Dept: FAMILY MEDICINE | Facility: CLINIC | Age: 75
End: 2025-01-16
Payer: MEDICARE

## 2025-01-16 VITALS
HEIGHT: 76 IN | SYSTOLIC BLOOD PRESSURE: 106 MMHG | RESPIRATION RATE: 15 BRPM | DIASTOLIC BLOOD PRESSURE: 74 MMHG | HEART RATE: 93 BPM | OXYGEN SATURATION: 95 % | TEMPERATURE: 97.7 F

## 2025-01-16 DIAGNOSIS — H60.90 UNSPECIFIED OTITIS EXTERNA, UNSPECIFIED EAR: ICD-10-CM

## 2025-01-16 DIAGNOSIS — R26.81 UNSTEADINESS ON FEET: ICD-10-CM

## 2025-01-16 DIAGNOSIS — I48.91 UNSPECIFIED ATRIAL FIBRILLATION: ICD-10-CM

## 2025-01-16 DIAGNOSIS — C61 MALIGNANT NEOPLASM OF PROSTATE: ICD-10-CM

## 2025-01-16 PROCEDURE — G2211 COMPLEX E/M VISIT ADD ON: CPT

## 2025-01-16 PROCEDURE — 99213 OFFICE O/P EST LOW 20 MIN: CPT

## 2025-01-16 RX ORDER — AZITHROMYCIN 250 MG/1
250 TABLET, FILM COATED ORAL
Qty: 1 | Refills: 0 | Status: ACTIVE | COMMUNITY
Start: 2025-01-16 | End: 1900-01-01

## 2025-03-19 NOTE — [facility] QUERY NOTE - NSCDINOTECODERS_GEN_A_CORE
CDI Specialist [FreeTextEntry1] :  #HM -CMP, CBC w. diff, Lipid, A1C, TSH w. rFT4, UA + micro -Flu shot annually -Tdap q10 years  -Hx of HPV vaccination -Use of sunscreen -Physical activity and healthy lifestyle recommended- diet low in processed foods and saturated fats and high in vegetables and whole grains -Dermatology evaluation for skin cancer screening -Annual dental and vision exam recommended -GYN annually     Labs drawn in office.